# Patient Record
Sex: FEMALE | Race: WHITE | Employment: OTHER
[De-identification: names, ages, dates, MRNs, and addresses within clinical notes are randomized per-mention and may not be internally consistent; named-entity substitution may affect disease eponyms.]

---

## 2017-07-14 ENCOUNTER — HEALTH MAINTENANCE LETTER (OUTPATIENT)
Age: 42
End: 2017-07-14

## 2017-09-19 ENCOUNTER — OFFICE VISIT (OUTPATIENT)
Dept: OBGYN | Facility: CLINIC | Age: 42
End: 2017-09-19
Payer: COMMERCIAL

## 2017-09-19 VITALS
HEIGHT: 65 IN | BODY MASS INDEX: 46.52 KG/M2 | HEART RATE: 93 BPM | DIASTOLIC BLOOD PRESSURE: 89 MMHG | WEIGHT: 279.2 LBS | SYSTOLIC BLOOD PRESSURE: 139 MMHG

## 2017-09-19 DIAGNOSIS — R87.810 CERVICAL HIGH RISK HPV (HUMAN PAPILLOMAVIRUS) TEST POSITIVE: ICD-10-CM

## 2017-09-19 DIAGNOSIS — Z01.419 ENCOUNTER FOR GYNECOLOGICAL EXAMINATION WITHOUT ABNORMAL FINDING: Primary | ICD-10-CM

## 2017-09-19 PROCEDURE — 87624 HPV HI-RISK TYP POOLED RSLT: CPT | Performed by: OBSTETRICS & GYNECOLOGY

## 2017-09-19 PROCEDURE — 99386 PREV VISIT NEW AGE 40-64: CPT | Performed by: OBSTETRICS & GYNECOLOGY

## 2017-09-19 PROCEDURE — 88175 CYTOPATH C/V AUTO FLUID REDO: CPT | Performed by: OBSTETRICS & GYNECOLOGY

## 2017-09-19 NOTE — NURSING NOTE
"Chief Complaint   Patient presents with     Physical       Initial /89 (BP Location: Right arm, Patient Position: Chair, Cuff Size: Adult Large)  Pulse 93  Ht 5' 4.75\" (1.645 m)  Wt 279 lb 3.2 oz (126.6 kg)  BMI 46.82 kg/m2 Estimated body mass index is 46.82 kg/(m^2) as calculated from the following:    Height as of this encounter: 5' 4.75\" (1.645 m).    Weight as of this encounter: 279 lb 3.2 oz (126.6 kg).  Medication Reconciliation: complete     Amy Vazquez LPN      "
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
## 2017-09-19 NOTE — PATIENT INSTRUCTIONS
Preventive Health Recommendations  Female Ages 40 to 49    Yearly exam:     See your health care provider every year in order to  1. Review health changes.   2. Discuss preventive care.    3. Review your medicines if your doctor prescribed any.      Get a Pap test every three years (unless you have an abnormal result and your provider advises testing more often).      If you get Pap tests with HPV test, you only need to test every 5 years, unless you have an abnormal result. You do not need a Pap test if your uterus was removed (hysterectomy) and you have not had cancer.      You should be tested each year for STDs (sexually transmitted diseases), if you're at risk.       Ask your doctor if you should have a mammogram.      Have a colonoscopy (test for colon cancer) if someone in your family has had colon cancer or polyps before age 50.       Have a cholesterol test every 5 years.       Have a diabetes test (fasting glucose) after age 45. If you are at risk for diabetes, you should have this test every 3 years.    Shots: Get a flu shot each year. Get a tetanus shot every 10 years.     Nutrition:     Eat at least 5 servings of fruits and vegetables each day.    Eat whole-grain bread, whole-wheat pasta and brown rice instead of white grains and rice.    Talk to your provider about Calcium and Vitamin D.     Lifestyle    Exercise at least 150 minutes a week (an average of 30 minutes a day, 5 days a week). This will help you control your weight and prevent disease.    Limit alcohol to one drink per day.    No smoking.     Wear sunscreen to prevent skin cancer.    See your dentist every six months for an exam and cleaning.  Given info and referrals

## 2017-09-19 NOTE — MR AVS SNAPSHOT
After Visit Summary   9/19/2017    Sienna Salinas    MRN: [id number]           Patient Information     Date Of Birth          1975        Visit Information        Provider Department      9/19/2017 1:00 PM Brit Moseley MD [facility]        Today's Diagnoses     Encounter for gynecological examination without abnormal finding    -  1    Cervical high risk HPV (human papillomavirus) test positive        BMI 45.0-49.9, adult (H)          Care Instructions      Preventive Health Recommendations  Female Ages 40 to 49    Yearly exam:     See your health care provider every year in order to  1. Review health changes.   2. Discuss preventive care.    3. Review your medicines if your doctor prescribed any.      Get a Pap test every three years (unless you have an abnormal result and your provider advises testing more often).      If you get Pap tests with HPV test, you only need to test every 5 years, unless you have an abnormal result. You do not need a Pap test if your uterus was removed (hysterectomy) and you have not had cancer.      You should be tested each year for STDs (sexually transmitted diseases), if you're at risk.       Ask your doctor if you should have a mammogram.      Have a colonoscopy (test for colon cancer) if someone in your family has had colon cancer or polyps before age 50.       Have a cholesterol test every 5 years.       Have a diabetes test (fasting glucose) after age 45. If you are at risk for diabetes, you should have this test every 3 years.    Shots: Get a flu shot each year. Get a tetanus shot every 10 years.     Nutrition:     Eat at least 5 servings of fruits and vegetables each day.    Eat whole-grain bread, whole-wheat pasta and brown rice instead of white grains and rice.    Talk to your provider about Calcium and Vitamin D.     Lifestyle    Exercise at least 150 minutes a week (an average of 30 minutes a day, 5 days a week).  This will help you control your weight and prevent disease.    Limit alcohol to one drink per day.    No smoking.     Wear sunscreen to prevent skin cancer.    See your dentist every six months for an exam and cleaning.  Given info and referrals          Follow-ups after your visit        Additional Services     NUTRITION REFERRAL       Your provider has referred you to: [facility]: [facility] [phone]   [email or web address]    Please be aware that coverage of these services is subject to the terms and limitations of your health insurance plan.  Call member services at your health plan with any benefit or coverage questions.      Please bring the following with you to your appointment:    (1) This referral request  (2) Any documents given to you regarding this referral  (3) Any specific questions you have about diet and/or food choices            WEIGHT MANAGEMENT/ [facility] LIFESTYLE PROGRAM REFERRAL       To schedule an appointment, please call the [facility] at  [phone] or [facility] at [phone].                  Who to contact     If you have questions or need follow up information about today's clinic visit or your schedule please contact [facility] directly at [phone].  Normal or non-critical lab and imaging results will be communicated to you by MyChart, letter or phone within 4 business days after the clinic has received the results. If you do not hear from us within 7 days, please contact the clinic through [employer] or phone. If you have a critical or abnormal lab result, we will notify you by phone as soon as possible.  Submit refill requests through [employer] or call your pharmacy and they will forward the refill request to us. Please allow 3 business days for your refill to be completed.          Additional Information About Your Visit        [employer] Information     [employer] gives you secure access to your electronic  "health record. If you see a primary care provider, you can also send messages to your care team and make appointments. If you have questions, please call your primary care clinic.  If you do not have a primary care provider, please call [phone] and they will assist you.        Care EveryWhere ID     This is your Care EveryWhere ID. This could be used by other organizations to access your [place] medical records  [id number]        Your Vitals Were     Pulse Height BMI (Body Mass Index)             93 5' 4.75\" (1.645 m) 46.82 kg/m2          Blood Pressure from Last 3 Encounters:   09/19/17 139/89   12/07/16 133/76   09/21/16 127/82    Weight from Last 3 Encounters:   09/19/17 279 lb 3.2 oz (126.6 kg)   12/07/16 293 lb (132.9 kg)   12/05/16 293 lb (132.9 kg)              We Performed the Following     HPV High Risk Types DNA Cervical     NUTRITION REFERRAL     Pap imaged thin layer diagnostic with HPV (select HPV order below)     WEIGHT MANAGEMENT/ [facility] LIFESTYLE PROGRAM REFERRAL        Primary Care Provider Office Phone # Fax #    Ary Josefina Francois [phone] [id number] [facility] [id number]        Equal Access to Services     SADE GIBBS AH: Hadii aad ku hadasho Soomaali, waaxda luqadaha, qaybta kaalmada adeegyada, waxay kareem perezn burke prajapati. So [facility] [phone].    ATENCIÓN: Si habla español, tiene a zabala disposición servicios gratuitos de asistencia lingüística. Llame al [phone].    We comply with applicable federal civil rights laws and [place] laws. We do not discriminate on the basis of race, color, national origin, age, disability sex, sexual orientation or gender identity.            Thank you!     Thank you for choosing [facility]  for your care. Our goal is always to provide you with excellent care. Hearing back from our patients is one way we can continue to improve our services. Please take a few minutes to complete the written " survey that you may receive in the mail after your visit with us. Thank you!             Your Updated Medication List - Protect others around you: Learn how to safely use, store and throw away your medicines at www.disposemymeds.org.          This list is accurate as of: 9/19/17  1:34 PM.  Always use your most recent med list.                   Brand Name Dispense Instructions for use Diagnosis    amitriptyline 25 MG tablet    ELAVIL    90 tablet    Take 1 tablet (25 mg) by mouth At Bedtime    Insomnia due to medical condition, Fibromyalgia       calcium-vitamin D 500-125 MG-UNIT Tabs           cyclobenzaprine 10 MG tablet    FLEXERIL    180 tablet    Take 1 tablet (10 mg) by mouth 2 times daily as needed for muscle spasms    Fibromyalgia       gabapentin 300 MG capsule    NEURONTIN    270 capsule    Take 1 capsule (300 mg) by mouth 3 times daily    Fibromyalgia       levonorgestrel 20 MCG/24HR IUD    MIRENA     1 each by Intrauterine route once        Magnesium 400 MG Tabs      Take 400 mg by mouth daily        Multi-vitamin Tabs tablet      Take 1 tablet by mouth daily        order for DME     1 Device    Equipment being ordered: wrist brace    Right arm pain       traMADol 50 MG tablet    ULTRAM    270 tablet    Take 1 tablet (50 mg) by mouth every 6 hours as needed for moderate pain ; may take with 325 mg of acetaminophen    Fibromyalgia

## 2017-09-21 LAB
COPATH REPORT: NORMAL
PAP: NORMAL

## 2017-09-22 LAB
FINAL DIAGNOSIS: NORMAL
HPV HR 12 DNA CVX QL NAA+PROBE: NEGATIVE
HPV16 DNA SPEC QL NAA+PROBE: NEGATIVE
HPV18 DNA SPEC QL NAA+PROBE: NEGATIVE
SPECIMEN DESCRIPTION: NORMAL

## 2017-10-09 ENCOUNTER — HOSPITAL ENCOUNTER (EMERGENCY)
Facility: CLINIC | Age: 42
Discharge: HOME | End: 2017-10-09
Attending: STUDENT IN AN ORGANIZED HEALTH CARE EDUCATION/TRAINING PROGRAM | Admitting: STUDENT IN AN ORGANIZED HEALTH CARE EDUCATION/TRAINING PROGRAM
Payer: COMMERCIAL

## 2017-10-09 VITALS
WEIGHT: 271 LBS | OXYGEN SATURATION: 96 % | SYSTOLIC BLOOD PRESSURE: 146 MMHG | DIASTOLIC BLOOD PRESSURE: 88 MMHG | HEIGHT: 65 IN | HEART RATE: 85 BPM | RESPIRATION RATE: 18 BRPM | TEMPERATURE: 98.2 F | BODY MASS INDEX: 45.15 KG/M2

## 2017-10-09 DIAGNOSIS — H04.301 DACROCYSTITIS, RIGHT: ICD-10-CM

## 2017-10-09 PROCEDURE — 99282 EMERGENCY DEPT VISIT SF MDM: CPT | Performed by: STUDENT IN AN ORGANIZED HEALTH CARE EDUCATION/TRAINING PROGRAM

## 2017-10-09 PROCEDURE — 99284 EMERGENCY DEPT VISIT MOD MDM: CPT | Mod: Z6 | Performed by: STUDENT IN AN ORGANIZED HEALTH CARE EDUCATION/TRAINING PROGRAM

## 2017-10-09 NOTE — ED AVS SNAPSHOT
[facility] Emergency Department    [address] [facility] [address]    Phone:  [phone]    Fax:  [phone]                                       Sienna Salinas   MRN: [id number]    Department:  [facility] Emergency Department   Date of Visit:  10/9/2017           Patient Information     Date Of Birth          1975        Your diagnoses for this visit were:     Dacrocystitis, right        You were seen by Zev Guajardo DO.      Follow-up Information     Follow up with [facility]. Schedule an appointment as soon as possible for a visit in 3 days.    Why:  Followup for reevaluation if symptoms persist.    Contact information:    [address] [facility] [address] [phone]        Discharge Instructions         Infected Tear Sac (Dacryocystitis)    Dacryocystitis is an infection of the tear sac. The tear sac is the narrow pouch where tears from the eye drain before they flow into the nose. You have 1 tear sac for each eye.  Tears moisten and clean the eyes. They are made in a gland under the upper eyelids. Tiny tubes called tear ducts drain excess tears away from the eyes. The tears flow into the tear sacs, and then into the nose.  In some cases, a tear sac can get infected. This can happen if the tear duct is blocked. A blocked duct can happen for many reasons. It may be caused by an injury to the nose or eye. Or the duct may have narrowed on its own. A blocked tear duct can t drain tears. Bacteria may then become trapped in the duct and in the tear sac.  Dacryocystitis can cause redness, swelling, and pain just below the lower lid, near the nose. A fluid (pus) may drain from the eye. Antibiotics are used to treat the infection and stop it from spreading. If the infection doesn t go away, or comes back often, minor surgery may be needed to open the duct.  Home care  Follow these guidelines when caring for yourself at home:    You will be given antibiotic  medicine to treat the infection. Follow all instructions for taking this medication. It may be taken by mouth. Or it may be eye drops or eye ointment. If you have pain, you may use acetaminophen or ibuprofen to reduce pain and fever. (Note: If you have chronic liver or kidney disease, or you have ever had a stomach ulcer or gastrointestinal bleeding, talk with your health care provider before using these medicines.)    Wash your hands before caring for your eye.    Several times a day, apply a warm compress for 1 to 2 minutes. A warm compress is a clean towel damp with warm water.    After the warm compress, massage the tear sac with clean hands. Place your index finger between the corner of the eye and the nose. Your finger should be pointing toward the top of the nose. Gently massage from the nose toward the eye. A small amount of tear fluid or pus may appear in the corner of the eye.  Using eye drops  Apply drops in the corner of the eye, where the eyelid meets the nose. The drops will pool in this area. When you blink or open your eyelid, the drops will flow into the eye. Use the exact number of drops prescribed. Be careful not to touch your eye or eyelashes with the dropper.  Using ointment  If both drops and ointment are prescribed, use the drops first. Wait 3 minutes, and then apply the ointment. Doing this will give each medicine time to work. To apply the ointment, start by gently pulling down your lower lid. Place a thin line of ointment along the inside of the lid. Begin at the nose and move outward. Close the lid. Wipe away excess medicine from the nose area outward. This is to keep the eyes as clean as possible. Keep your eye closed for 1 or 2 minutes so the medicine has time to coat the eye. Eye ointment may cause blurry vision. This is normal. It may help to apply ointment at bedtime instead of during the day.  Follow-up care  Follow up with your health care provider, or as advised.  When to seek  medical advice  Call your health care provider right away if any of these occur.    Fever of 100.4 F (38 C) or higher after 2 days of antibiotic treatment    You are pregnant    You just had surgery or another medical procedure, or were just discharged from the hospital    Symptoms don t get better, or get worse  Date Last Reviewed: 6/24/2015 2000-2017 [employer]. [address] [place] [address], [place] [address]. All rights reserved. This information is not intended as a substitute for professional medical care. Always follow your healthcare professional's instructions.          Future Appointments        Provider Department Dept Phone Center    10/18/2017 11:30 AM [facility] MAMMO ROOM 2 [employer] [phone] [facility]      24 Hour Appointment Hotline       To make an appointment at any [facility] clinic, call [phone] ([phone]). If you don't have a family doctor or clinic, we will help you find one. [facility] clinics are conveniently located to serve the needs of you and your family.             Review of your medicines      START taking        Dose / Directions Last dose taken    amoxicillin-clavulanate 875-125 MG per tablet   Commonly known as:  AUGMENTIN   Dose:  1 tablet   Quantity:  20 tablet        Take 1 tablet by mouth 2 times daily for 10 days   Refills:  0          Our records show that you are taking the medicines listed below. If these are incorrect, please call your family doctor or clinic.        Dose / Directions Last dose taken    amitriptyline 25 MG tablet   Commonly known as:  ELAVIL   Dose:  25 mg   Quantity:  90 tablet        Take 1 tablet (25 mg) by mouth At Bedtime   Refills:  3        calcium-vitamin D 500-125 MG-UNIT Tabs        Refills:  0        cyclobenzaprine 10 MG tablet   Commonly known as:  FLEXERIL   Dose:  10 mg   Quantity:  180 tablet        Take 1 tablet (10 mg) by mouth 2 times daily as needed for muscle spasms   Refills:  1         gabapentin 300 MG capsule   Commonly known as:  NEURONTIN   Dose:  300 mg   Quantity:  270 capsule        Take 1 capsule (300 mg) by mouth 3 times daily   Refills:  3        levonorgestrel 20 MCG/24HR IUD   Commonly known as:  MIRENA   Dose:  1 each        1 each by Intrauterine route once   Refills:  0        Magnesium 400 MG Tabs   Dose:  400 mg        Take 400 mg by mouth daily   Refills:  0        Multi-vitamin Tabs tablet   Dose:  1 tablet        Take 1 tablet by mouth daily   Refills:  0        order for DME   Quantity:  1 Device        Equipment being ordered: wrist brace   Refills:  0        traMADol 50 MG tablet   Commonly known as:  ULTRAM   Dose:  50 mg   Quantity:  270 tablet        Take 1 tablet (50 mg) by mouth every 6 hours as needed for moderate pain ; may take with 325 mg of acetaminophen   Refills:  1                Prescriptions were sent or printed at these locations (1 Prescription)                   Other Prescriptions                Printed at Department/Unit printer (1 of 1)         amoxicillin-clavulanate (AUGMENTIN) 875-125 MG per tablet                Orders Needing Specimen Collection     None      Pending Results     No orders found from 10/7/2017 to 10/10/2017.            Pending Culture Results     No orders found from 10/7/2017 to 10/10/2017.            Pending Results Instructions     If you had any lab results that were not finalized at the time of your Discharge, you can call the ED Lab Result RN at [phone]. You will be contacted by this team for any positive Lab results or changes in treatment. The nurses are available 7 days a week from 10A to 6:30P.  You can leave a message 24 hours per day and they will return your call.        Test Results From Your Hospital Stay               Thank you for choosing [facility]       Thank you for choosing [facility] for your care. Our goal is always to provide you with excellent care. Hearing back from our patients is one way we can continue  to improve our services. Please take a few minutes to complete the written survey that you may receive in the mail after you visit with us. Thank you!        [facility] Information     [facility] gives you secure access to your electronic health record. If you see a primary care provider, you can also send messages to your care team and make appointments. If you have questions, please call your primary care clinic.  If you do not have a primary care provider, please call [phone] and they will assist you.        Care EveryWhere ID     This is your Care EveryWhere ID. This could be used by other organizations to access your [facility] medical records  [id number]        Equal Access to Services     SADE GIBBS : Imelda Nash, carolyn eller, taco bergeron. So [facility] [phone].    ATENCIÓN: Si habla español, tiene a zabala disposición servicios gratuitos de asistencia lingüística. Llame al [phone].    We comply with applicable federal civil rights laws and [place] laws. We do not discriminate on the basis of race, color, national origin, age, disability, sex, sexual orientation, or gender identity.            After Visit Summary       This is your record. Keep this with you and show to your community pharmacist(s) and doctor(s) at your next visit.

## 2017-10-09 NOTE — ED AVS SNAPSHOT
[facility] Emergency Department    [address] [facility] [address]    Phone:  [phone]    Fax:  [phone]                                       Sienna Salinas   MRN: [id number]    Department:  [facility] Emergency Department   Date of Visit:  10/9/2017           After Visit Summary Signature Page     I have received my discharge instructions, and my questions have been answered. I have discussed any challenges I see with this plan with the nurse or doctor.    ..........................................................................................................................................  Patient/Patient Representative Signature      ..........................................................................................................................................  Patient Representative Print Name and Relationship to Patient    ..................................................               ................................................  Date                                            Time    ..........................................................................................................................................  Reviewed by Signature/Title    ...................................................              ..............................................  Date                                                            Time

## 2017-10-10 NOTE — DISCHARGE INSTRUCTIONS
Infected Tear Sac (Dacryocystitis)    Dacryocystitis is an infection of the tear sac. The tear sac is the narrow pouch where tears from the eye drain before they flow into the nose. You have 1 tear sac for each eye.  Tears moisten and clean the eyes. They are made in a gland under the upper eyelids. Tiny tubes called tear ducts drain excess tears away from the eyes. The tears flow into the tear sacs, and then into the nose.  In some cases, a tear sac can get infected. This can happen if the tear duct is blocked. A blocked duct can happen for many reasons. It may be caused by an injury to the nose or eye. Or the duct may have narrowed on its own. A blocked tear duct can t drain tears. Bacteria may then become trapped in the duct and in the tear sac.  Dacryocystitis can cause redness, swelling, and pain just below the lower lid, near the nose. A fluid (pus) may drain from the eye. Antibiotics are used to treat the infection and stop it from spreading. If the infection doesn t go away, or comes back often, minor surgery may be needed to open the duct.  Home care  Follow these guidelines when caring for yourself at home:    You will be given antibiotic medicine to treat the infection. Follow all instructions for taking this medication. It may be taken by mouth. Or it may be eye drops or eye ointment. If you have pain, you may use acetaminophen or ibuprofen to reduce pain and fever. (Note: If you have chronic liver or kidney disease, or you have ever had a stomach ulcer or gastrointestinal bleeding, talk with your health care provider before using these medicines.)    Wash your hands before caring for your eye.    Several times a day, apply a warm compress for 1 to 2 minutes. A warm compress is a clean towel damp with warm water.    After the warm compress, massage the tear sac with clean hands. Place your index finger between the corner of the eye and the nose. Your finger should be pointing toward the top of the  nose. Gently massage from the nose toward the eye. A small amount of tear fluid or pus may appear in the corner of the eye.  Using eye drops  Apply drops in the corner of the eye, where the eyelid meets the nose. The drops will pool in this area. When you blink or open your eyelid, the drops will flow into the eye. Use the exact number of drops prescribed. Be careful not to touch your eye or eyelashes with the dropper.  Using ointment  If both drops and ointment are prescribed, use the drops first. Wait 3 minutes, and then apply the ointment. Doing this will give each medicine time to work. To apply the ointment, start by gently pulling down your lower lid. Place a thin line of ointment along the inside of the lid. Begin at the nose and move outward. Close the lid. Wipe away excess medicine from the nose area outward. This is to keep the eyes as clean as possible. Keep your eye closed for 1 or 2 minutes so the medicine has time to coat the eye. Eye ointment may cause blurry vision. This is normal. It may help to apply ointment at bedtime instead of during the day.  Follow-up care  Follow up with your health care provider, or as advised.  When to seek medical advice  Call your health care provider right away if any of these occur.    Fever of 100.4 F (38 C) or higher after 2 days of antibiotic treatment    You are pregnant    You just had surgery or another medical procedure, or were just discharged from the hospital    Symptoms don t get better, or get worse  Date Last Reviewed: 6/24/2015 2000-2017 [employer]. [address] [place] [address], [place] [address]. All rights reserved. This information is not intended as a substitute for professional medical care. Always follow your healthcare professional's instructions.

## 2017-10-10 NOTE — ED PROVIDER NOTES
History     Chief Complaint   Patient presents with     Stye / Hordeolum Evaluation     stye R eye     HPI  Sienna Salinas is a 41 year old female who presents for evaluation of right lower eyelid swelling over the past 24 hours. Patient states that she has had a stye in the past symptoms feel somewhat similar. The swelling began along the medial aspect of her lower eyelid but she also notes tenderness along the right side of her nose. She is without headache, double vision, ocular pain, or other concerning features.    I have reviewed the Medications, Allergies, Past Medical and Surgical History, and Social History in the Epic system.    Patient Active Problem List   Diagnosis     Cervical high risk HPV (human papillomavirus) test positive     IUD (intrauterine device) in place     Fibromyalgia     Morbid obesity (H)     Positive JONATHAN (antinuclear antibody)     Other atopic dermatitis and related conditions     History of vitamin D deficiency     Insomnia       No past surgical history on file.    Social History     Social History     Marital status:      Spouse name: Selvin     Number of children: 2     Years of education: N/A     Occupational History     Office      fulltime     Social History Main Topics     Smoking status: Former Smoker     Smokeless tobacco: Former User      Comment: quit as a teen     Alcohol use Yes      Comment: social     Drug use: No     Sexual activity: Yes     Partners: Male     Birth control/ protection: IUD      Comment: Mirena     Other Topics Concern     Not on file     Social History Narrative    How much exercise per week? 4x wk, walking and yoga    How much calcium per day? Not sure       How much caffeine per day? 1 cup coffee    How much vitamin D per day? Not sure    Do you/your family wear seatbelts?  Yes    Do you/your family use safety helmets? Yes    Do you/your family use sunscreen? Yes    Do you/your family keep firearms in the home? No    Do you/your family  "have a smoke detector(s)? Yes    Do you feel safe in your home? Yes    Has anyone ever touched you in an unwanted manner? No     Explain     See PittsJED gonzalez 02/26/2015       Family History   Problem Relation Age of Onset     DIABETES Father      Alcohol/Drug Mother      Other - See Comments Mother      embolism lung     DIABETES Paternal Grandmother      Allergies Brother      Seasonal     Allergies Sister      Seasonal       Most Recent Immunizations   Administered Date(s) Administered     Influenza (IIV3) 10/01/2014     Influenza Vaccine IM 3yrs+ 4 Valent IIV4 09/21/2016     TDAP Vaccine (Adacel) 07/14/2015         Review of Systems  Constitutional: Negative for fever or chills.  HENT: Negative oral or throat pain.  Eye: Positive right lower eyelid swelling and tenderness. Negative for visual changes from baseline.  Neurological: Negative for headache or dizziness.    All others reviewed and are negative.      Physical Exam   BP: 146/88  Pulse: 85  Temp: 98.2  F (36.8  C)  Resp: 18  Height: 165.1 cm (5' 5\")  Weight: 122.9 kg (271 lb)  SpO2: 96 %       Physical Exam  Constitutional: Well developed, well nourished. Appears nontoxic and in no acute distress.   Head: Normocephalic and atraumatic.  Eye: Right lower eyelid tenderness and inflammation along the medial aspect near tear duct, no expressible purulent discharge. No proptosis or subconjunctival hemorrhage. Negative for conjunctivitis, ciliary flushing or ocular discharge. EOMI and patient denies diplopia. PERRLA without pain. Anterior chamber depth equal bilaterally. Cornea appears clear without hyphema or hypopyon.  Neck: Neck supple.  Cardiovascular: No cyanosis.   Respiratory/Chest: Effort normal, no respiratory distress.   Musculoskeletal: Moves all extremities spontaneously and without complaint.  Neuro: Patient is alert.  Skin: Skin is warm and dry, not diaphoretic.  Psych: Appears to have a normal mood and affect.      ED Course     ED Course " "    Procedures               Critical Care time:  none               Labs Ordered and Resulted from Time of ED Arrival Up to the Time of Departure from the ED - No data to display    Assessments & Plan (with Medical Decision Making)   Sienna Salinas is a 41 year old female who presents to the department for evaluation of \"stye\" of right lower eyelid. The eye itself does not appear to be infected but difficult to discern whether she is suffering from a hordeolum or more likely dacryocystitis of the right lower eyelid. Does not appear consistent with chalazion. She will be started on oral antibiotic Augmentin and encouraged to use warm compresses 3 times a day while monitoring for improvement. If symptoms do not dramatically improve over the next 48-72 hours, she should schedule a follow-up with ophthalmology clinic.    Prior to discharge, I made it clear that illness can unexpectedly develop/progress so she has been instructed to return to the emergency department for reevaluation of evolving symptoms, change in severity, or other concerns. She seems comfortable with the discharge plan we discussed including follow up.    Disclaimer: This note consists of symbols derived from keyboarding, dictation, and/or voice recognition software. As a result, there may be errors in the script that have gone undetected.  Please consider this when interpreting information found in the chart.        I have reviewed the nursing notes.    I have reviewed the findings, diagnosis, plan and need for follow up with the patient.       Discharge Medication List as of 10/9/2017  8:40 PM      START taking these medications    Details   amoxicillin-clavulanate (AUGMENTIN) 875-125 MG per tablet Take 1 tablet by mouth 2 times daily for 10 days, Disp-20 tablet, R-0, Local Print             Final diagnoses:   Dacrocystitis, right       10/9/2017   [facility] EMERGENCY DEPARTMENT     Zev Guajardo,   10/09/17 2332    "

## 2017-10-18 ENCOUNTER — HOSPITAL ENCOUNTER (OUTPATIENT)
Dept: MAMMOGRAPHY | Facility: CLINIC | Age: 42
Discharge: HOME | End: 2017-10-18
Attending: OBSTETRICS & GYNECOLOGY | Admitting: OBSTETRICS & GYNECOLOGY
Payer: COMMERCIAL

## 2017-10-18 DIAGNOSIS — Z12.31 VISIT FOR SCREENING MAMMOGRAM: ICD-10-CM

## 2017-10-18 PROCEDURE — G0202 SCR MAMMO BI INCL CAD: HCPCS

## 2017-10-24 NOTE — PROGRESS NOTES
Your results are normal.  Please followup as was discussed in the office or call with questions.  Brit Moseley MD

## 2017-10-26 ENCOUNTER — ALLIED HEALTH/NURSE VISIT (OUTPATIENT)
Dept: FAMILY MEDICINE | Facility: CLINIC | Age: 42
End: 2017-10-26
Payer: COMMERCIAL

## 2017-10-26 DIAGNOSIS — Z23 NEED FOR PROPHYLACTIC VACCINATION AND INOCULATION AGAINST INFLUENZA: Primary | ICD-10-CM

## 2017-10-26 PROCEDURE — 90471 IMMUNIZATION ADMIN: CPT

## 2017-10-26 PROCEDURE — 99207 ZZC NO CHARGE NURSE ONLY: CPT

## 2017-10-26 PROCEDURE — 90686 IIV4 VACC NO PRSV 0.5 ML IM: CPT

## 2017-10-26 NOTE — MR AVS SNAPSHOT
After Visit Summary   10/26/2017    Sienna Salinas    MRN: [id number]           Patient Information     Date Of Birth          1975        Visit Information        Provider Department      10/26/2017 3:15 PM Juan M/Gavino Ni [facility]        Today's Diagnoses     Need for prophylactic vaccination and inoculation against influenza    -  1       Follow-ups after your visit        Who to contact     If you have questions or need follow up information about today's clinic visit or your schedule please contact [facility] directly at [phone].  Normal or non-critical lab and imaging results will be communicated to you by [facility], letter or phone within 4 business days after the clinic has received the results. If you do not hear from us within 7 days, please contact the clinic through [employer] or phone. If you have a critical or abnormal lab result, we will notify you by phone as soon as possible.  Submit refill requests through [employer] or call your pharmacy and they will forward the refill request to us. Please allow 3 business days for your refill to be completed.          Additional Information About Your Visit        MyChart Information     [employer] gives you secure access to your electronic health record. If you see a primary care provider, you can also send messages to your care team and make appointments. If you have questions, please call your primary care clinic.  If you do not have a primary care provider, please call [phone] and they will assist you.        Care EveryWhere ID     This is your Care EveryWhere ID. This could be used by other organizations to access your [facility] medical records  [id number]         Blood Pressure from Last 3 Encounters:   10/09/17 146/88   09/19/17 139/89   12/07/16 133/76    Weight from Last 3 Encounters:   10/09/17 271 lb (122.9 kg)   09/19/17 279 lb 3.2 oz (126.6 kg)   12/07/16 293 lb (132.9 kg)               We Performed the Following     FLU VAC, SPLIT VIRUS IM > 3 YO (QUADRIVALENT) [66206]     Vaccine Administration, Initial [25328]        Primary Care Provider Office Phone # Fax #    [facility] [phone] [address]  [facility] [address]        Equal Access to Services     SADE GIBBS : Hadii aad ku hadasho Soomaali, waaxda luqadaha, qaybta kaalmada adeegyada, waxay adelitain hayaan adeeg khcallumsh laLoganaan . So [facility] [phone].    ATENCIÓN: Si habla español, tiene a zabala disposición servicios gratuitos de asistencia lingüística. Llame al [phone].    We comply with applicable federal civil rights laws and [place] laws. We do not discriminate on the basis of race, color, national origin, age, disability, sex, sexual orientation, or gender identity.            Thank you!     Thank you for choosing [facility]  for your care. Our goal is always to provide you with excellent care. Hearing back from our patients is one way we can continue to improve our services. Please take a few minutes to complete the written survey that you may receive in the mail after your visit with us. Thank you!             Your Updated Medication List - Protect others around you: Learn how to safely use, store and throw away your medicines at www.disposemymeds.org.          This list is accurate as of: 10/26/17  3:26 PM.  Always use your most recent med list.                   Brand Name Dispense Instructions for use Diagnosis    calcium-vitamin D 500-125 MG-UNIT Tabs      Take 1 tablet by mouth daily        cyclobenzaprine 10 MG tablet    FLEXERIL    180 tablet    Take 1 tablet (10 mg) by mouth 2 times daily as needed for muscle spasms    Fibromyalgia       levonorgestrel 20 MCG/24HR IUD    MIRENA     1 each by Intrauterine route once        Magnesium 400 MG Tabs      Take 400 mg by mouth daily        Multi-vitamin Tabs tablet      Take 1 tablet by mouth daily        order for  DME     1 Device    Equipment being ordered: wrist brace    Right arm pain       traMADol 50 MG tablet    ULTRAM    270 tablet    Take 1 tablet (50 mg) by mouth every 6 hours as needed for moderate pain ; may take with 325 mg of acetaminophen    Fibromyalgia

## 2017-10-26 NOTE — PROGRESS NOTES

## 2018-01-23 ENCOUNTER — HOSPITAL ENCOUNTER (EMERGENCY)
Facility: CLINIC | Age: 43
Discharge: HOME | End: 2018-01-23
Attending: NURSE PRACTITIONER | Admitting: NURSE PRACTITIONER
Payer: COMMERCIAL

## 2018-01-23 VITALS
BODY MASS INDEX: 45.93 KG/M2 | WEIGHT: 276 LBS | TEMPERATURE: 99.7 F | RESPIRATION RATE: 18 BRPM | OXYGEN SATURATION: 99 % | HEART RATE: 91 BPM | DIASTOLIC BLOOD PRESSURE: 101 MMHG | SYSTOLIC BLOOD PRESSURE: 161 MMHG
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
## 2018-01-23 DIAGNOSIS — M54.2 NECK PAIN: Primary | ICD-10-CM

## 2018-01-23 DIAGNOSIS — M79.10 MUSCLE ACHE: ICD-10-CM

## 2018-01-23 DIAGNOSIS — M79.7 FIBROMYALGIA: ICD-10-CM

## 2018-01-23 DIAGNOSIS — M54.50 ACUTE LEFT-SIDED LOW BACK PAIN WITHOUT SCIATICA: ICD-10-CM

## 2018-01-23 PROCEDURE — 99213 OFFICE O/P EST LOW 20 MIN: CPT | Performed by: NURSE PRACTITIONER

## 2018-01-23 PROCEDURE — G0463 HOSPITAL OUTPT CLINIC VISIT: HCPCS

## 2018-01-23 RX ORDER — TIZANIDINE 2 MG/1
2 TABLET ORAL 3 TIMES DAILY PRN
Qty: 30 TABLET | Refills: 0 | Status: SHIPPED | OUTPATIENT
Start: 2018-01-23 | End: 2018-01-25 | Stop reason: SINTOL

## 2018-01-23 RX ORDER — TRAMADOL HYDROCHLORIDE 50 MG/1
50 TABLET ORAL EVERY 6 HOURS PRN
Qty: 30 TABLET | Refills: 0 | Status: SHIPPED | OUTPATIENT
Start: 2018-01-23 | End: 2018-08-17

## 2018-01-23 ASSESSMENT — ENCOUNTER SYMPTOMS
VOMITING: 0
MYALGIAS: 1
CONSTIPATION: 0
NAUSEA: 0
EYE PAIN: 0
CHOKING: 0
SEIZURES: 0
SORE THROAT: 0
COUGH: 0
DIAPHORESIS: 0
CHILLS: 0
DIFFICULTY URINATING: 0
BLOOD IN STOOL: 0
SHORTNESS OF BREATH: 0
SINUS PAIN: 0
HEADACHES: 1
WHEEZING: 0
BRUISES/BLEEDS EASILY: 0
SINUS PRESSURE: 0
FEVER: 0
DIARRHEA: 0
SPEECH DIFFICULTY: 0
ABDOMINAL PAIN: 0
HEMATURIA: 0
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
## 2018-01-23 NOTE — ED AVS SNAPSHOT
[facility] Emergency Department    [address] [address]    Phone:  [phone]    Fax:  [phone]                                       Sienna Salinas   MRN: [id number]    Department:  [facility] Emergency Department   Date of Visit:  1/23/2018           After Visit Summary Signature Page     I have received my discharge instructions, and my questions have been answered. I have discussed any challenges I see with this plan with the nurse or doctor.    ..........................................................................................................................................  Patient/Patient Representative Signature      ..........................................................................................................................................  Patient Representative Print Name and Relationship to Patient    ..................................................               ................................................  Date                                            Time    ..........................................................................................................................................  Reviewed by Signature/Title    ...................................................              ..............................................  Date                                                            Time
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
## 2018-01-24 ENCOUNTER — TELEPHONE (OUTPATIENT)
Dept: FAMILY MEDICINE | Facility: CLINIC | Age: 43
End: 2018-01-24
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
## 2018-01-25 RX ORDER — CYCLOBENZAPRINE HCL 5 MG
5 TABLET ORAL 3 TIMES DAILY PRN
Qty: 30 TABLET | Refills: 0 | Status: SHIPPED | OUTPATIENT
Start: 2018-01-25 | End: 2018-08-17

## 2018-06-22 ENCOUNTER — OFFICE VISIT (OUTPATIENT)
Dept: URGENT CARE | Facility: URGENT CARE | Age: 43
End: 2018-06-22
Payer: COMMERCIAL

## 2018-06-22 VITALS
SYSTOLIC BLOOD PRESSURE: 122 MMHG | BODY MASS INDEX: 47.63 KG/M2 | WEIGHT: 286.2 LBS | RESPIRATION RATE: 18 BRPM | HEART RATE: 60 BPM | DIASTOLIC BLOOD PRESSURE: 66 MMHG | TEMPERATURE: 98.6 F

## 2018-06-22 DIAGNOSIS — B37.31 CANDIDIASIS OF VULVA AND VAGINA: ICD-10-CM

## 2018-06-22 DIAGNOSIS — J01.01 ACUTE RECURRENT MAXILLARY SINUSITIS: Primary | ICD-10-CM

## 2018-06-22 PROCEDURE — 99213 OFFICE O/P EST LOW 20 MIN: CPT | Performed by: NURSE PRACTITIONER

## 2018-06-22 RX ORDER — FLUCONAZOLE 150 MG/1
150 TABLET ORAL ONCE
Qty: 1 TABLET | Refills: 1 | Status: SHIPPED | OUTPATIENT
Start: 2018-06-22 | End: 2018-06-22

## 2018-06-22 RX ORDER — AZITHROMYCIN 250 MG/1
TABLET, FILM COATED ORAL
Qty: 6 TABLET | Refills: 0 | Status: SHIPPED | OUTPATIENT
Start: 2018-06-22 | End: 2018-08-17

## 2018-06-22 NOTE — NURSING NOTE
"Chief Complaint   Patient presents with     URI     Started over 1.5 weeks ago. Congestion, pressure, cough, fatigue. Fever earlier in the week.  Mucinex        Initial /66 (BP Location: Right arm, Cuff Size: Adult Large)  Pulse 60  Temp 98.6  F (37  C) (Tympanic)  Resp 18  Wt 286 lb 3.2 oz (129.8 kg)  BMI 47.63 kg/m2 Estimated body mass index is 47.63 kg/(m^2) as calculated from the following:    Height as of 10/9/17: 5' 5\" (1.651 m).    Weight as of this encounter: 286 lb 3.2 oz (129.8 kg).      Health Maintenance that is potentially due pending provider review:  NONE    n/a    Is there anyone who you would like to be able to receive your results? Not Applicable  If yes have patient fill out KAREN  James Barraza M.A.        "

## 2018-06-22 NOTE — PROGRESS NOTES
SUBJECTIVE:   Sienna Salinas is a 42 year old female who presents to clinic today for the following health issues:  Chief Complaint   Patient presents with     URI     Started over 1.5 weeks ago. Congestion, pressure, cough, fatigue. Fever earlier in the week.  Mucinex                  Problem list and histories reviewed & adjusted, as indicated.  Additional history: as documented    Patient Active Problem List   Diagnosis     Cervical high risk HPV (human papillomavirus) test positive     IUD (intrauterine device) in place     Fibromyalgia     Morbid obesity (H)     Positive JONATHAN (antinuclear antibody)     Other atopic dermatitis and related conditions     History of vitamin D deficiency     Insomnia     History reviewed. No pertinent surgical history.    Social History   Substance Use Topics     Smoking status: Former Smoker     Smokeless tobacco: Former User      Comment: quit as a teen     Alcohol use Yes      Comment: social     Family History   Problem Relation Age of Onset     Diabetes Father      Alcohol/Drug Mother      Other - See Comments Mother      embolism lung     Diabetes Paternal Grandmother      Allergies Brother      Seasonal     Allergies Sister      Seasonal         Current Outpatient Prescriptions   Medication Sig Dispense Refill     azithromycin (ZITHROMAX Z-SINDI) 250 MG tablet Take 2 tablets on day 1 and then take 1 tablet days 2-5 6 tablet 0     calcium-vitamin D 500-125 MG-UNIT TABS Take 1 tablet by mouth daily        cyclobenzaprine (FLEXERIL) 5 MG tablet Take 1 tablet (5 mg) by mouth 3 times daily as needed for muscle spasms 30 tablet 0     fluconazole (DIFLUCAN) 150 MG tablet Take 1 tablet (150 mg) by mouth once for 1 dose Repeat in 4-5 days if needed. 1 tablet 1     levonorgestrel (MIRENA) 20 MCG/24HR IUD 1 each by Intrauterine route once       Magnesium 400 MG TABS Take 400 mg by mouth daily       multivitamin, therapeutic with minerals (MULTI-VITAMIN) TABS Take 1 tablet by mouth  daily       order for DME Equipment being ordered: wrist brace 1 Device 0     traMADol (ULTRAM) 50 MG tablet Take 1 tablet (50 mg) by mouth every 6 hours as needed for moderate pain ; may take with 500 mg of acetaminophen 30 tablet 0     Allergies   Allergen Reactions     Shellfish Allergy Anaphylaxis     Iodine Hives     Sulfa Drugs Hives     And vomiting     Labs reviewed in EPIC    Reviewed and updated as needed this visit by clinical staff  Tobacco  Allergies  Meds  Problems  Med Hx  Surg Hx  Fam Hx  Soc Hx        Reviewed and updated as needed this visit by Provider  Allergies  Meds  Problems         ROS:  Constitutional, HEENT, cardiovascular, pulmonary, GI, , musculoskeletal, neuro, skin, endocrine and psych systems are negative, except as otherwise noted.    OBJECTIVE:     /66 (BP Location: Right arm, Cuff Size: Adult Large)  Pulse 60  Temp 98.6  F (37  C) (Tympanic)  Resp 18  Wt 286 lb 3.2 oz (129.8 kg)  BMI 47.63 kg/m2  Body mass index is 47.63 kg/(m^2).   GENERAL: healthy, alert and no distress, nontoxic in appearance  EYES: Eyes grossly normal to inspection, PERRL and conjunctivae and sclerae normal  HENT: ear canals and TM's normal, nose and mouth without ulcers or lesions, very congested with maxillary pain more on left  NECK: no adenopathy, supple with full ROM  RESP: lungs clear to auscultation - no rales, rhonchi or wheezes  CV: regular rate and rhythm, normal S1 S2, no S3 or S4, no murmur, click or rub, no peripheral edema   ABDOMEN: soft, nontender, no hepatosplenomegaly, no masses and bowel sounds normal  MS: no gross musculoskeletal defects noted, no edema  No rash    Diagnostic Test Results:  No results found for this or any previous visit (from the past 24 hour(s)).    ASSESSMENT/PLAN:   Will also give diflucan as she always gets yeast infection with antibiotic. She has recurrent sinus infections and states zithromax works best for her.  Problem List Items Addressed This  Visit     None      Visit Diagnoses     Acute recurrent maxillary sinusitis    -  Primary    Relevant Medications    azithromycin (ZITHROMAX Z-SINDI) 250 MG tablet    fluconazole (DIFLUCAN) 150 MG tablet    Candidiasis of vulva and vagina        Relevant Medications    fluconazole (DIFLUCAN) 150 MG tablet               Patient Instructions   Increase rest and fluids. Tylenol and/or Ibuprofen for comfort. Cool mist vaporizer. If your symptoms worsen or do not resolve follow up with your primary care provider in 2 weeks and sooner if needed.      Mucinex 600 mg 12 hour formula for ear, head and chest congestion.  It can also thin post nasal drip which can cause a cough and sore throat.    Indications for emergent return to emergency department discussed with patient, who verbalized good understanding and agreement.  Patient understands the limitations of today's evaluation.           Sinusitis (Antibiotic Treatment)    The sinuses are air-filled spaces within the bones of the face. They connect to the inside of the nose. Sinusitis is an inflammation of the tissue that lines the sinuses. Sinusitis can occur during a cold. It can also happen due to allergies to pollens and other particles in the air. Sinusitis can cause symptoms of sinus congestion and a feeling of fullness. A sinus infection causes fever, headache, and facial pain. There is often green or yellow fluid draining from the nose or into the back of the throat (post-nasal drip). You have been given antibiotics to treat this condition.  Home care    Take the full course of antibiotics as instructed. Do not stop taking them, even when you feel better.    Drink plenty of water, hot tea, and other liquids. This may help thin nasal mucus. It also may help your sinuses drain fluids.    Heat may help soothe painful areas of your face. Use a towel soaked in hot water. Or,  the shower and direct the warm spray onto your face. Using a vaporizer along with a  menthol rub at night may also help soothe symptoms.     An expectorant with guaifenesin may help thin nasal mucus and help your sinuses drain fluids.    You can use an over-the-counter decongestant, unless a similar medicine was prescribed to you. Nasal sprays work the fastest. Use one that contains phenylephrine or oxymetazoline. First blow your nose gently. Then use the spray. Do not use these medicines more often than directed on the label. If you do, your symptoms may get worse. You may also take pills that contain pseudoephedrine. Don t use products that combine multiple medicines. This is because side effects may be increased. Read labels. You can also ask the pharmacist for help. (People with high blood pressure should not use decongestants. They can raise blood pressure.)    Over-the-counter antihistamines may help if allergies contributed to your sinusitis.      Do not use nasal rinses or irrigation during an acute sinus infection, unless your healthcare provider tells you to. Rinsing may spread the infection to other areas in your sinuses.    Use acetaminophen or ibuprofen to control pain, unless another pain medicine was prescribed to you. If you have chronic liver or kidney disease or ever had a stomach ulcer, talk with your healthcare provider before using these medicines. (Aspirin should never be taken by anyone under age 18 who is ill with a fever. It may cause severe liver damage.)    Don't smoke. This can make symptoms worse.  Follow-up care  Follow up with your healthcare provider or our staff if you are better in 1 week.  When to seek medical advice  Call your healthcare provider if any of these occur:    Facial pain or headache that gets worse    Stiff neck    Unusual drowsiness or confusion    Swelling of your forehead or eyelids    Vision problems, such as blurred or double vision    Fever of 100.4 F (38 C) or higher, or as directed by your healthcare provider    Seizure    Breathing  problems    Symptoms don't go away in 10 days  Prevention  Here are steps you can take to help prevent an infection:    Keep good hand washing habits.    Don t have close contact with people who have sore throats, colds, or other upper respiratory infections.    Don t smoke, and stay away from secondhand smoke.    Stay up to date with of your vaccines.  Date Last Reviewed: 11/1/2017 2000-2017 [employer]. [address] [place] [address], [place] [address]. All rights reserved. This information is not intended as a substitute for professional medical care. Always follow your healthcare professional's instructions.            [facility]
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
## 2018-06-23 NOTE — PATIENT INSTRUCTIONS
Increase rest and fluids. Tylenol and/or Ibuprofen for comfort. Cool mist vaporizer. If your symptoms worsen or do not resolve follow up with your primary care provider in 2 weeks and sooner if needed.      Mucinex 600 mg 12 hour formula for ear, head and chest congestion.  It can also thin post nasal drip which can cause a cough and sore throat.    Indications for emergent return to emergency department discussed with patient, who verbalized good understanding and agreement.  Patient understands the limitations of today's evaluation.           Sinusitis (Antibiotic Treatment)    The sinuses are air-filled spaces within the bones of the face. They connect to the inside of the nose. Sinusitis is an inflammation of the tissue that lines the sinuses. Sinusitis can occur during a cold. It can also happen due to allergies to pollens and other particles in the air. Sinusitis can cause symptoms of sinus congestion and a feeling of fullness. A sinus infection causes fever, headache, and facial pain. There is often green or yellow fluid draining from the nose or into the back of the throat (post-nasal drip). You have been given antibiotics to treat this condition.  Home care    Take the full course of antibiotics as instructed. Do not stop taking them, even when you feel better.    Drink plenty of water, hot tea, and other liquids. This may help thin nasal mucus. It also may help your sinuses drain fluids.    Heat may help soothe painful areas of your face. Use a towel soaked in hot water. Or,  the shower and direct the warm spray onto your face. Using a vaporizer along with a menthol rub at night may also help soothe symptoms.     An expectorant with guaifenesin may help thin nasal mucus and help your sinuses drain fluids.    You can use an over-the-counter decongestant, unless a similar medicine was prescribed to you. Nasal sprays work the fastest. Use one that contains phenylephrine or oxymetazoline. First blow  your nose gently. Then use the spray. Do not use these medicines more often than directed on the label. If you do, your symptoms may get worse. You may also take pills that contain pseudoephedrine. Don t use products that combine multiple medicines. This is because side effects may be increased. Read labels. You can also ask the pharmacist for help. (People with high blood pressure should not use decongestants. They can raise blood pressure.)    Over-the-counter antihistamines may help if allergies contributed to your sinusitis.      Do not use nasal rinses or irrigation during an acute sinus infection, unless your healthcare provider tells you to. Rinsing may spread the infection to other areas in your sinuses.    Use acetaminophen or ibuprofen to control pain, unless another pain medicine was prescribed to you. If you have chronic liver or kidney disease or ever had a stomach ulcer, talk with your healthcare provider before using these medicines. (Aspirin should never be taken by anyone under age 18 who is ill with a fever. It may cause severe liver damage.)    Don't smoke. This can make symptoms worse.  Follow-up care  Follow up with your healthcare provider or our staff if you are better in 1 week.  When to seek medical advice  Call your healthcare provider if any of these occur:    Facial pain or headache that gets worse    Stiff neck    Unusual drowsiness or confusion    Swelling of your forehead or eyelids    Vision problems, such as blurred or double vision    Fever of 100.4 F (38 C) or higher, or as directed by your healthcare provider    Seizure    Breathing problems    Symptoms don't go away in 10 days  Prevention  Here are steps you can take to help prevent an infection:    Keep good hand washing habits.    Don t have close contact with people who have sore throats, colds, or other upper respiratory infections.    Don t smoke, and stay away from secondhand smoke.    Stay up to date with of your  vaccines.  Date Last Reviewed: 11/1/2017 2000-2017 [employer]. [address] [place] [address], [place] [address]. All rights reserved. This information is not intended as a substitute for professional medical care. Always follow your healthcare professional's instructions.

## 2018-08-17 ENCOUNTER — HOSPITAL ENCOUNTER (EMERGENCY)
Facility: CLINIC | Age: 43
Discharge: HOME | End: 2018-08-17
Attending: NURSE PRACTITIONER | Admitting: NURSE PRACTITIONER
Payer: COMMERCIAL

## 2018-08-17 VITALS
RESPIRATION RATE: 18 BRPM | DIASTOLIC BLOOD PRESSURE: 87 MMHG | SYSTOLIC BLOOD PRESSURE: 142 MMHG | OXYGEN SATURATION: 98 % | TEMPERATURE: 98.4 F

## 2018-08-17 DIAGNOSIS — S61.012A THUMB LACERATION, LEFT, INITIAL ENCOUNTER: ICD-10-CM

## 2018-08-17 PROCEDURE — 12001 RPR S/N/AX/GEN/TRNK 2.5CM/<: CPT | Mod: Z6 | Performed by: NURSE PRACTITIONER

## 2018-08-17 PROCEDURE — G0463 HOSPITAL OUTPT CLINIC VISIT: HCPCS | Performed by: NURSE PRACTITIONER

## 2018-08-17 PROCEDURE — 99213 OFFICE O/P EST LOW 20 MIN: CPT | Mod: 25 | Performed by: NURSE PRACTITIONER

## 2018-08-17 PROCEDURE — 12001 RPR S/N/AX/GEN/TRNK 2.5CM/<: CPT | Performed by: NURSE PRACTITIONER

## 2018-08-17 RX ORDER — BUPIVACAINE HYDROCHLORIDE 2.5 MG/ML
INJECTION, SOLUTION INFILTRATION; PERINEURAL
Status: DISCONTINUED
Start: 2018-08-17 | End: 2018-08-17 | Stop reason: HOSPADM

## 2018-08-17 ASSESSMENT — ENCOUNTER SYMPTOMS
ABDOMINAL PAIN: 0
SORE THROAT: 0
DIAPHORESIS: 0
WHEEZING: 0
SINUS PAIN: 0
EYE PAIN: 0
NAUSEA: 0
FEVER: 0
VOMITING: 0
WOUND: 1
SHORTNESS OF BREATH: 0
CHILLS: 0
CONSTIPATION: 0
DIARRHEA: 0
DYSURIA: 0
COUGH: 0
HEMATURIA: 0
DIFFICULTY URINATING: 0
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
## 2018-08-17 NOTE — DISCHARGE INSTRUCTIONS
Small or Superficial Laceration: Not Stitched  A laceration is a cut through the skin. A laceration requires stitches or staples if it is deep or spread open. A small laceration often doesn't require stitches.   You may need a tetanus shot. This may be given if you have no record of this vaccination and the object that caused the cut may lead to tetanus  Home care    Your healthcare provider may prescribe an antibiotic. This is to help prevent infection. Follow all instructions for taking this medicine. Take the medicine every day until it is gone or you are told to stop. You should not have any left over.    The healthcare provider may prescribe medicines for pain. Follow instructions for taking them.    Follow the healthcare provider s instructions on how to care for the cut.    Wash your hands with soap and warm water before and after caring for cut. This helps prevent infection.    Keep the wound clean and dry. If a bandage was applied and it becomes wet or dirty, replace it. Otherwise, leave it in place for the first 24 hours, then change it once a day or as directed.    Clean the wound daily:  ? After removing any bandage, wash the area with soap and water. Use a wet cotton swab to loosen and remove any blood or crust that forms.  ? After cleaning, keep the wound clean and dry. Talk with your healthcare provider before applying any antibiotic ointment to the wound. Reapply a fresh bandage.    You may remove the bandage to shower as usual after the first 24 hours, but don't soak the area in water (no tub baths or swimming) for the next 5 days.    If the area gets wet, gently pat it dry with a clean cloth. Replace the wet bandage with a dry one.    Don't do activities that may reinjure your wound.    Don't scratch, rub, or pick at the area.    Check the wound daily for signs of infection listed below.  Follow-up care  Follow up with your healthcare provider, or as advised.  When to seek medical advice  Call  your healthcare provider right away if any of these occur:    Wound bleeding not controlled by direct pressure    Signs of infection, including increasing pain in the wound, increasing wound redness or swelling, or pus or bad odor coming from the wound    Fever of 100.4 F (38 C) or higher, or as directed by your healthcare provider    Wound edges reopen    Wound changes colors    Numbness around the wound     Decreased movement around the injured area  Date Last Reviewed: 7/1/2017 2000-2017 [employer]. [address] [place] [address]. All rights reserved. This information is not intended as a substitute for professional medical care. Always follow your healthcare professional's instructions.

## 2018-08-17 NOTE — ED AVS SNAPSHOT
[facility] Emergency Department    [address] [facility] [address]    Phone:  [phone]    Fax:  [phone]                                       Sienna Salinas   MRN: [id number]    Department:  [facility] Emergency Department   Date of Visit:  8/17/2018           After Visit Summary Signature Page     I have received my discharge instructions, and my questions have been answered. I have discussed any challenges I see with this plan with the nurse or doctor.    ..........................................................................................................................................  Patient/Patient Representative Signature      ..........................................................................................................................................  Patient Representative Print Name and Relationship to Patient    ..................................................               ................................................  Date                                            Time    ..........................................................................................................................................  Reviewed by Signature/Title    ...................................................              ..............................................  Date                                                            Time

## 2018-08-17 NOTE — ED PROVIDER NOTES
History     Chief Complaint   Patient presents with     Laceration     left thumb- cutting bread     HPI  Sienna Salinas is a 42 year old female who presents with left thumb laceration.  Pt states was cutting bread with a serrated knife and cut her left thumb.  PT reports normal sensation and movement of left thumb.  Pt states pain level 2/10 and is described as tingling, achy.  Reports feeling well otherwise    Problem List:    Patient Active Problem List    Diagnosis Date Noted     Positive JONATHAN (antinuclear antibody) 04/02/2015     Priority: Medium     1:80 titre, did not fit criteria for lupus       Other atopic dermatitis and related conditions 04/02/2015     Priority: Medium     History of vitamin D deficiency 04/02/2015     Priority: Medium     Insomnia 04/02/2015     Priority: Medium     Fibromyalgia 03/18/2015     Priority: Medium     referrred to [facility] for myofascial pain relief       Morbid obesity (H) 03/18/2015     Priority: Medium     IUD (intrauterine device) in place 03/17/2015     Priority: Medium     Mirena Feb 2015       Cervical high risk HPV (human papillomavirus) test positive 03/11/2015     Priority: Medium     2/26/2015: NIL Pap, + HR HPV (Neg 16/18)  Needs repeat pap/HPV 2/2016. Pt aware.  6/14/16: ASCUS, Neg HPV. Plan colp  9/21/16: No colp done per GYN provider. Plan cotest in 1 year (6/14/17).   9/19/17 Pap: NIL/neg HPV. Plan to repeat Pap+HPV cotesting in 3 yrs (2020).          Past Medical History:    Past Medical History:   Diagnosis Date     ASCUS of cervix with negative high risk HPV 6/14/16     Chronic fatigue syndrome 2013     Dysmenorrhea      Encounter for insertion of mirena IUD 10/04/2010, Feb 2015     Fibromyalgia 2013     Pituitary adenoma (H)        Past Surgical History:    No past surgical history on file.    Family History:    Family History   Problem Relation Age of Onset     Diabetes Father      Alcohol/Drug Mother      Other - See Comments Mother       embolism lung     Diabetes Paternal Grandmother      Allergies Brother      Seasonal     Allergies Sister      Seasonal       Social History:  Marital Status:   [2]  Social History   Substance Use Topics     Smoking status: Former Smoker     Smokeless tobacco: Former User      Comment: quit as a teen     Alcohol use Yes      Comment: social        Medications:      calcium-vitamin D 500-125 MG-UNIT TABS   levonorgestrel (MIRENA) 20 MCG/24HR IUD   Magnesium 400 MG TABS   multivitamin, therapeutic with minerals (MULTI-VITAMIN) TABS   order for DME     Review of Systems   Constitutional: Negative for chills, diaphoresis and fever.   HENT: Negative for ear pain, sinus pain and sore throat.    Eyes: Negative for pain.   Respiratory: Negative for cough, shortness of breath and wheezing.    Cardiovascular: Negative for chest pain.   Gastrointestinal: Negative for abdominal pain, constipation, diarrhea, nausea and vomiting.   Genitourinary: Negative for difficulty urinating, dysuria, hematuria and urgency.   Skin: Positive for wound (left thumb).   All other systems reviewed and are negative.      Physical Exam   BP: 142/87  Heart Rate: 80  Temp: 98.4  F (36.9  C)  Resp: 18  SpO2: 98 %      Physical Exam   Constitutional: She appears well-developed and well-nourished.   HENT:   Head: Normocephalic and atraumatic.   Eyes: Conjunctivae are normal. Right eye exhibits no discharge. Left eye exhibits no discharge.   Cardiovascular: Normal rate, regular rhythm and normal heart sounds.  Exam reveals no gallop and no friction rub.    No murmur heard.  Pulmonary/Chest: Effort normal and breath sounds normal. No respiratory distress. She has no wheezes. She has no rales. She exhibits no tenderness.   Neurological: She is alert.   Skin: Skin is warm and dry. Laceration (2 mm superficial laceration noted on tip of left thumb without any bleeding) noted.   Psychiatric: She has a normal mood and affect.   Nursing note and vitals  reviewed.      ED Course     ED Course     Procedures  The laceration was cleansed with saline and Hibiclens and was dressed with bacitracin and a Band-Aid.  Patient was instructed on wound healing and monitoring for infection.    No results found for this or any previous visit (from the past 24 hour(s)).    Medications   bupivacaine (MARCAINE) 0.25 % injection (not administered)       Assessments & Plan (with Medical Decision Making)     I have reviewed the nursing notes.    I have reviewed the findings, diagnosis, plan and need for follow up with the patient.  Sienna Salinas is a 42 year old female who presents with left thumb laceration.  Pt states was cutting bread with a serrated knife and cut her left thumb.  PT reports normal sensation and movement of left thumb.  Pt states pain level 2/10 and is described as tingling, achy.  Reports feeling well otherwise.    Exam as noted above.  The laceration was cleansed with saline and Hibiclens and was dressed with bacitracin and a Band-Aid.  Patient was instructed on wound healing and monitoring for infection.  Last tetanus was updated July 24 of 2015 and no need to update tetanus today.  Patient encouraged to follow-up if there are concerns of infection.    Discharge Medication List as of 8/17/2018  4:59 PM          Final diagnoses:   Thumb laceration, left, initial encounter - superficial       8/17/2018   [facility] EMERGENCY DEPARTMENT     Merle Garcia APRN CNP  08/17/18 [id number]

## 2018-10-01 ENCOUNTER — ALLIED HEALTH/NURSE VISIT (OUTPATIENT)
Dept: FAMILY MEDICINE | Facility: CLINIC | Age: 43
End: 2018-10-01
Payer: COMMERCIAL

## 2018-10-01 DIAGNOSIS — Z23 NEED FOR PROPHYLACTIC VACCINATION AND INOCULATION AGAINST INFLUENZA: Primary | ICD-10-CM

## 2018-10-01 PROCEDURE — 90686 IIV4 VACC NO PRSV 0.5 ML IM: CPT

## 2018-10-01 PROCEDURE — 90471 IMMUNIZATION ADMIN: CPT

## 2018-10-01 PROCEDURE — 99207 ZZC NO CHARGE NURSE ONLY: CPT

## 2018-10-01 NOTE — MR AVS SNAPSHOT
After Visit Summary   10/1/2018    Sienna Salinas    MRN: [id number]           Patient Information     Date Of Birth          1975        Visit Information        Provider Department      10/1/2018 1:45 PM [facility]        Today's Diagnoses     Need for prophylactic vaccination and inoculation against influenza    -  1       Follow-ups after your visit        Who to contact     If you have questions or need follow up information about today's clinic visit or your schedule please contact [facility] directly at [phone].  Normal or non-critical lab and imaging results will be communicated to you by [employer], letter or phone within 4 business days after the clinic has received the results. If you do not hear from us within 7 days, please contact the clinic through [employer] or phone. If you have a critical or abnormal lab result, we will notify you by phone as soon as possible.  Submit refill requests through [employer] or call your pharmacy and they will forward the refill request to us. Please allow 3 business days for your refill to be completed.          Additional Information About Your Visit        MyChart Information     [employer] gives you secure access to your electronic health record. If you see a primary care provider, you can also send messages to your care team and make appointments. If you have questions, please call your primary care clinic.  If you do not have a primary care provider, please call [phone] and they will assist you.        Care EveryWhere ID     This is your Care EveryWhere ID. This could be used by other organizations to access your [place] medical records  [id number]         Blood Pressure from Last 3 Encounters:   08/17/18 142/87   06/22/18 122/66   01/23/18 (!) 161/101    Weight from Last 3 Encounters:   06/22/18 286 lb 3.2 oz (129.8 kg)   01/23/18 276 lb (125.2 kg)   10/09/17 271 lb (122.9 kg)               We Performed the Following     FLU VACCINE, SPLIT VIRUS, IM (QUADRIVALENT) [51505]- >3 YRS     Vaccine Administration, Initial [01866]        Primary Care Provider Fax #    Physician No Ref-Primary [phone]       No address on file        Equal Access to Services     OCTAVIANOSERVANDO SAI : Imelda betty graff damaso Nash, wamunirda luqadaha, qahuseyinta kaalmada evgeny, taco adelitain hayaakristan sernadarby panchal roz prajapati. So [facility] [phone].    ATENCIÓN: Si habla español, tiene a zabala disposición servicios gratuitos de asistencia lingüística. Llame al [phone].    We comply with applicable federal civil rights laws and [place] laws. We do not discriminate on the basis of race, color, national origin, age, disability, sex, sexual orientation, or gender identity.            Thank you!     Thank you for choosing [facility]  for your care. Our goal is always to provide you with excellent care. Hearing back from our patients is one way we can continue to improve our services. Please take a few minutes to complete the written survey that you may receive in the mail after your visit with us. Thank you!             Your Updated Medication List - Protect others around you: Learn how to safely use, store and throw away your medicines at www.disposemymeds.org.          This list is accurate as of 10/1/18  1:46 PM.  Always use your most recent med list.                   Brand Name Dispense Instructions for use Diagnosis    calcium-vitamin D 500-125 MG-UNIT Tabs      Take 1 tablet by mouth daily        levonorgestrel 20 MCG/24HR IUD    MIRENA     1 each by Intrauterine route once        Magnesium 400 MG Tabs      Take 400 mg by mouth daily        Multi-vitamin Tabs tablet      Take 1 tablet by mouth daily        order for DME     1 Device    Equipment being ordered: wrist brace    Right arm pain

## 2018-10-01 NOTE — PROGRESS NOTES

## 2018-11-26 ENCOUNTER — OFFICE VISIT (OUTPATIENT)
Dept: FAMILY MEDICINE | Facility: CLINIC | Age: 43
End: 2018-11-26
Payer: COMMERCIAL

## 2018-11-26 VITALS
OXYGEN SATURATION: 99 % | BODY MASS INDEX: 45.82 KG/M2 | RESPIRATION RATE: 18 BRPM | TEMPERATURE: 97.6 F | HEART RATE: 86 BPM | HEIGHT: 65 IN | WEIGHT: 275 LBS | SYSTOLIC BLOOD PRESSURE: 132 MMHG | DIASTOLIC BLOOD PRESSURE: 84 MMHG

## 2018-11-26 DIAGNOSIS — M79.7 FIBROMYALGIA: Primary | ICD-10-CM

## 2018-11-26 PROCEDURE — 99214 OFFICE O/P EST MOD 30 MIN: CPT | Performed by: FAMILY MEDICINE

## 2018-11-26 RX ORDER — AMITRIPTYLINE HYDROCHLORIDE 10 MG/1
TABLET ORAL
Qty: 90 TABLET | Refills: 0 | Status: SHIPPED | OUTPATIENT
Start: 2018-11-26 | End: 2019-04-25

## 2018-11-26 ASSESSMENT — PAIN SCALES - GENERAL: PAINLEVEL: MILD PAIN (3)

## 2018-11-26 NOTE — MR AVS SNAPSHOT
After Visit Summary   11/26/2018    Sienna Salinas    MRN: [id number]           Patient Information     Date Of Birth          1975        Visit Information        Provider Department      11/26/2018 4:40 PM Guillermina Trevino MD [facility]        Today's Diagnoses     Fibromyalgia    -  1      Care Instructions          Thank you for choosing [facility].  You may be receiving a survey in the mail from [employer] regarding your visit today.  Please take a few minutes to complete and return the survey to let us know how we are doing.      Our Clinic hours are:  Mondays    7:20 am - 7 pm  Tues -  Fri  7:20 am - 5 pm    Clinic Phone: [phone]    The clinic lab opens at 7:30 am Mon - Fri and appointments are required.    [facility]. [phone]  Monday  8 am - 7pm  Tues - Fri 8 am - 5:30 pm                 Follow-ups after your visit        Your next 10 appointments already scheduled     Dec 20, 2018  3:40 PM CST   Telephone Visit with Guillermina Trevino MD   [facility]    [address] [facility] [address]   [phone]           Note: this is not an onsite visit; there is no need to come to the facility.              Who to contact     If you have questions or need follow up information about today's clinic visit or your schedule please contact [facility] directly at [phone].  Normal or non-critical lab and imaging results will be communicated to you by MyChart, letter or phone within 4 business days after the clinic has received the results. If you do not hear from us within 7 days, please contact the clinic through MyChart or phone. If you have a critical or abnormal lab result, we will notify you by phone as soon as possible.  Submit refill requests through [employer] or call your pharmacy and they will forward the refill request to us. Please allow 3  "business days for your refill to be completed.          Additional Information About Your Visit        [employer] Information     [employer] gives you secure access to your electronic health record. If you see a primary care provider, you can also send messages to your care team and make appointments. If you have questions, please call your primary care clinic.  If you do not have a primary care provider, please call [phone] and they will assist you.        Care EveryWhere ID     This is your Care EveryWhere ID. This could be used by other organizations to access your [facility] records  [id number]        Your Vitals Were     Pulse Temperature Respirations Height Pulse Oximetry Breastfeeding?    86 97.6  F (36.4  C) (Tympanic) 18 5' 5\" (1.651 m) 99% No    BMI (Body Mass Index)                   45.76 kg/m2            Blood Pressure from Last 3 Encounters:   11/26/18 132/84   08/17/18 142/87   06/22/18 122/66    Weight from Last 3 Encounters:   11/26/18 275 lb (124.7 kg)   06/22/18 286 lb 3.2 oz (129.8 kg)   01/23/18 276 lb (125.2 kg)              Today, you had the following     No orders found for display         Today's Medication Changes          These changes are accurate as of 11/26/18  5:28 PM.  If you have any questions, ask your nurse or doctor.               Start taking these medicines.        Dose/Directions    amitriptyline 10 MG tablet   Commonly known as:  ELAVIL   Used for:  Fibromyalgia   Started by:  Guillermina Trevino MD        Start at 1 tab at HS for 3 days, then 2 tabs atHS for 3 days, then 3 tabs at HS.   Quantity:  90 tablet   Refills:  0            Where to get your medicines      These medications were sent to [facility] – [place], [place] - [address]  [address], [facility] [address]     Phone:  [phone]     amitriptyline 10 MG tablet                Primary Care Provider Fax #    Physician No Ref-Primary [phone]       No " address on file        Equal Access to Services     [facility] : Hadii betty graff damaso Nash, waanahi dirkselamha, trev katyaxochitltika pepper, waxlatoya kareem berkowitzcallumidania prajapati. So [facility] [phone].    ATENCIÓN: Si habla español, tiene a zabala disposición servicios gratuitos de asistencia lingüística. Llame al [phone].    We comply with applicable federal civil rights laws and [place] laws. We do not discriminate on the basis of race, color, national origin, age, disability, sex, sexual orientation, or gender identity.            Thank you!     Thank you for choosing [facility] your [facility]. Our goal is always to provide you with excellent care. Hearing back from our patients is one way we can continue to improve our services. Please take a few minutes to complete the written survey that you may receive in the mail after your visit with us. Thank you!             Your Updated Medication List - Protect others around you: Learn how to safely use, store and throw away your medicines at www.disposemymeds.org.          This list is accurate as of 11/26/18  5:28 PM.  Always use your most recent med list.                   Brand Name Dispense Instructions for use Diagnosis    amitriptyline 10 MG tablet    ELAVIL    90 tablet    Start at 1 tab at HS for 3 days, then 2 tabs atHS for 3 days, then 3 tabs at HS.    Fibromyalgia       calcium-vitamin D 500-125 MG-UNIT Tabs      Take 1 tablet by mouth daily        levonorgestrel 20 MCG/24HR IUD    MIRENA     1 each by Intrauterine route once        Magnesium 400 MG Tabs      Take 400 mg by mouth daily        Multi-vitamin Tabs tablet      Take 1 tablet by mouth daily

## 2018-11-26 NOTE — PATIENT INSTRUCTIONS
Thank you for choosing [facility].  You may be receiving a survey in the mail from [facility] regarding your visit today.  Please take a few minutes to complete and return the survey to let us know how we are doing.      Our Clinic hours are:  Mondays    7:20 am - 7 pm  Tues - Fri  7:20 am - 5 pm    Clinic Phone: [phone]    The clinic lab opens at 7:30 am Mon - Fri and appointments are required.    [facility]. [phone]  Monday  8 am - 7pm  Tues - Fri 8 am - 5:30 pm

## 2018-11-26 NOTE — PROGRESS NOTES
"Chief Complaint   Patient presents with     Fibromyalgia     Patient stopped all medication for Fibromyalgia. Patient was taking tramadol, flexeril and gabapentin. Patient would like to discuss restarting medication. Patient rates pain at a 3/10 currently 7/10 at it's worse.        Has been off of all medication for Fibromyalgia for almost a year.    Now noticing she's more sore all the time.    Works at [employer] ([employer]  Has now been there x 2 months.  Gets a lot of steps/day - 17,000 so far today!  She does feel like she's getting enough water.  Sleep is a bit off as she's up at 3-4 am to get to work by 5 am.      \"it's nothing like before\". She is working with meditation, etc.   Feels like she's creeping toward having more problems - has been to the point in the past where she couldn't walk or lift her head.     /84  Pulse 86  Temp 97.6  F (36.4  C) (Tympanic)  Resp 18  Ht 5' 5\" (1.651 m)  Wt 275 lb (124.7 kg)  SpO2 99%  Breastfeeding? No  BMI 45.76 kg/m2  EXAM: GENERAL APPEARANCE ADULT: Alert, no acute distress  PSYCH: mentation appears normal., affect and mood normal    ASSESSMENT/PLAN:      ICD-10-CM    1. Fibromyalgia M79.7 amitriptyline (ELAVIL) 10 MG tablet     Discussed starting amitriptyline (has been on this before and felt it was helpful).  May help her sleep and her pain.  Will gradually titrate up.  Will recheck with a phone visit in a month.     Would avoid tramadol as studies have shown worse outcomes with narcotics/narcotic like analgesics.     Guillermina Trevino M.D.      Patient Instructions         Thank you for choosing [facility].  You may be receiving a survey in the mail from [employer] regarding your visit today.  Please take a few minutes to complete and return the survey to let us know how we are doing.      Our Clinic hours are:  Mondays    7:20 am - 7 pm  Tues -  Fri  7:20 am - 5 pm    Clinic Phone: [phone]    The clinic lab opens at 7:30 " am Mon - Fri and appointments are required.    [facility]  Ph. [phone]  Monday  8 am - 7pm  Tues - Fri 8 am - 5:30 pm

## 2018-12-19 NOTE — PROGRESS NOTES
"Sienna Salinas is a 43 year old female who is being evaluated via a billable telephone visit.      The patient has been notified of following:     \"This telephone visit will be conducted via a call between you and your physician/provider. We have found that certain health care needs can be provided without the need for a physical exam.  This service lets us provide the care you need with a short phone conversation.  If a prescription is necessary we can send it directly to your pharmacy.  If lab work is needed we can place an order for that and you can then stop by our lab to have the test done at a later time.    If during the course of the call the physician/provider feels a telephone visit is not appropriate, you will not be charged for this service.\"     Consent has been obtained for this service by 1 care team member: yes. See the scanned image in the medical record.    Sienna Salinas complains of    Chief Complaint   Patient presents with     Fibromyalgia     Patient feels like the amitriptyline is possibly making her foggy. Patient is only taking 2 tablets. Patient has been doing doing extra traveling and has some stressful situations going on and she would like to stay on 2 tablets for now. Patient rates pain currently 4/10.        I have reviewed and updated the patient's Past Medical History, Social History, Family History and Medication List.    ALLERGIES  Shellfish allergy; Iodine; and Sulfa drugs    Yolanda Hudson MA   (MA signature)    Chief Complaint   Patient presents with     Fibromyalgia     Patient feels like the amitriptyline is possibly making her foggy. Patient is only taking 2 tablets. Patient has been doing doing extra traveling and has some stressful situations going on and she would like to stay on 2 tablets for now. Patient rates pain currently 4/10.      Hasn't gone up past 2 tablets (20 mg) yet due to feeling foggy.  Mother in law just passed away, doing 10 hour days at " work.      Thinks the pain has been somewhat helped by the amitriptyline.  Has been taking some hot baths which also helps. Because of all the stress it's hard to tell if her body is just reacting to the stress.           Assessment/Plan:  (M79.7) Fibromyalgia  Comment:    Plan: amitriptyline (ELAVIL) 25 MG tablet        Increase to 25 mg daily - this is a dose she was previously on and would like to try again, she has done okay on 25 mg in the past.       I have reviewed the note as documented above.  This accurately captures the substance of my conversation with the patient.       Total time of call between patient and provider was 5 minutes      Guillermina Trevino M.D.

## 2018-12-20 ENCOUNTER — VIRTUAL VISIT (OUTPATIENT)
Dept: FAMILY MEDICINE | Facility: CLINIC | Age: 43
End: 2018-12-20
Payer: COMMERCIAL

## 2018-12-20 DIAGNOSIS — M79.7 FIBROMYALGIA: ICD-10-CM

## 2018-12-20 PROCEDURE — 99441 ZZC PHYSICIAN TELEPHONE EVALUATION 5-10 MIN: CPT | Performed by: FAMILY MEDICINE

## 2018-12-20 NOTE — PATIENT INSTRUCTIONS
Thank you for choosing [facility].  You may be receiving a survey in the mail from [facility] regarding your visit today.  Please take a few minutes to complete and return the survey to let us know how we are doing.      Our Clinic hours are:  Mondays    7:20 am - 7 pm  Tues - Fri  7:20 am - 5 pm    Clinic Phone: [phone]    The clinic lab opens at 7:30 am Mon - Fri and appointments are required.    [facility]. [phone]  Monday  8 am - 7pm  Tues - Fri 8 am - 5:30 pm

## 2019-04-24 ENCOUNTER — OFFICE VISIT (OUTPATIENT)
Dept: BEHAVIORAL HEALTH | Facility: CLINIC | Age: 44
End: 2019-04-24
Payer: COMMERCIAL

## 2019-04-24 DIAGNOSIS — F41.1 GAD (GENERALIZED ANXIETY DISORDER): ICD-10-CM

## 2019-04-24 DIAGNOSIS — F41.0 PANIC DISORDER WITHOUT AGORAPHOBIA: Primary | ICD-10-CM

## 2019-04-24 PROCEDURE — 90791 PSYCH DIAGNOSTIC EVALUATION: CPT | Performed by: SOCIAL WORKER
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
## 2019-04-25 ENCOUNTER — OFFICE VISIT (OUTPATIENT)
Dept: FAMILY MEDICINE | Facility: CLINIC | Age: 44
End: 2019-04-25
Payer: COMMERCIAL

## 2019-04-25 VITALS
OXYGEN SATURATION: 96 % | HEART RATE: 95 BPM | WEIGHT: 282 LBS | HEIGHT: 65 IN | TEMPERATURE: 97.6 F | BODY MASS INDEX: 46.98 KG/M2 | DIASTOLIC BLOOD PRESSURE: 84 MMHG | SYSTOLIC BLOOD PRESSURE: 134 MMHG

## 2019-04-25 DIAGNOSIS — F41.0 PANIC ATTACK: ICD-10-CM

## 2019-04-25 DIAGNOSIS — F41.1 GAD (GENERALIZED ANXIETY DISORDER): Primary | ICD-10-CM

## 2019-04-25 PROCEDURE — 99213 OFFICE O/P EST LOW 20 MIN: CPT | Performed by: NURSE PRACTITIONER

## 2019-04-25 RX ORDER — PAROXETINE 10 MG/1
10 TABLET, FILM COATED ORAL EVERY MORNING
Qty: 49 TABLET | Refills: 0 | Status: SHIPPED | OUTPATIENT
Start: 2019-04-25 | End: 2019-05-17 | Stop reason: DRUGHIGH

## 2019-04-25 RX ORDER — HYDROXYZINE HYDROCHLORIDE 25 MG/1
25 TABLET, FILM COATED ORAL 3 TIMES DAILY PRN
Qty: 30 TABLET | Refills: 3 | Status: SHIPPED | OUTPATIENT
Start: 2019-04-25 | End: 2020-09-23

## 2019-04-25 ASSESSMENT — ANXIETY QUESTIONNAIRES
6. BECOMING EASILY ANNOYED OR IRRITABLE: SEVERAL DAYS
GAD7 TOTAL SCORE: 16
GAD7 TOTAL SCORE: 9
5. BEING SO RESTLESS THAT IT IS HARD TO SIT STILL: MORE THAN HALF THE DAYS
2. NOT BEING ABLE TO STOP OR CONTROL WORRYING: SEVERAL DAYS
3. WORRYING TOO MUCH ABOUT DIFFERENT THINGS: NEARLY EVERY DAY
7. FEELING AFRAID AS IF SOMETHING AWFUL MIGHT HAPPEN: SEVERAL DAYS
IF YOU CHECKED OFF ANY PROBLEMS ON THIS QUESTIONNAIRE, HOW DIFFICULT HAVE THESE PROBLEMS MADE IT FOR YOU TO DO YOUR WORK, TAKE CARE OF THINGS AT HOME, OR GET ALONG WITH OTHER PEOPLE: NOT DIFFICULT AT ALL
7. FEELING AFRAID AS IF SOMETHING AWFUL MIGHT HAPPEN: NOT AT ALL
IF YOU CHECKED OFF ANY PROBLEMS ON THIS QUESTIONNAIRE, HOW DIFFICULT HAVE THESE PROBLEMS MADE IT FOR YOU TO DO YOUR WORK, TAKE CARE OF THINGS AT HOME, OR GET ALONG WITH OTHER PEOPLE: VERY DIFFICULT
3. WORRYING TOO MUCH ABOUT DIFFERENT THINGS: SEVERAL DAYS
2. NOT BEING ABLE TO STOP OR CONTROL WORRYING: MORE THAN HALF THE DAYS
1. FEELING NERVOUS, ANXIOUS, OR ON EDGE: NEARLY EVERY DAY
1. FEELING NERVOUS, ANXIOUS, OR ON EDGE: MORE THAN HALF THE DAYS
6. BECOMING EASILY ANNOYED OR IRRITABLE: MORE THAN HALF THE DAYS
5. BEING SO RESTLESS THAT IT IS HARD TO SIT STILL: MORE THAN HALF THE DAYS

## 2019-04-25 ASSESSMENT — PATIENT HEALTH QUESTIONNAIRE - PHQ9
SUM OF ALL RESPONSES TO PHQ QUESTIONS 1-9: 8
SUM OF ALL RESPONSES TO PHQ QUESTIONS 1-9: 6
5. POOR APPETITE OR OVEREATING: MORE THAN HALF THE DAYS
5. POOR APPETITE OR OVEREATING: NEARLY EVERY DAY

## 2019-04-25 ASSESSMENT — MIFFLIN-ST. JEOR: SCORE: 1940.02

## 2019-04-25 NOTE — PATIENT INSTRUCTIONS
1.  Start paxil 10 mg daily for one week and decrease amitriptyline to 1/2 tab the first week.  On the second week increase Paxil to 2 tabs and discontinue amitriptyline.  Continue this for the remainder of the month.  2.  Use Hydroxyzine as needed 3 times daily for panic attacks or increase in anxiety.  3.  Follow-up in one month for re-evaluation and refills.
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
## 2019-04-26 ASSESSMENT — ANXIETY QUESTIONNAIRES
GAD7 TOTAL SCORE: 9
GAD7 TOTAL SCORE: 16

## 2019-05-01 ENCOUNTER — OFFICE VISIT (OUTPATIENT)
Dept: BEHAVIORAL HEALTH | Facility: CLINIC | Age: 44
End: 2019-05-01
Payer: COMMERCIAL

## 2019-05-01 DIAGNOSIS — F41.0 PANIC DISORDER WITHOUT AGORAPHOBIA: Primary | ICD-10-CM

## 2019-05-01 DIAGNOSIS — F41.1 GAD (GENERALIZED ANXIETY DISORDER): ICD-10-CM

## 2019-05-01 PROCEDURE — 90834 PSYTX W PT 45 MINUTES: CPT | Performed by: SOCIAL WORKER

## 2019-05-02 ASSESSMENT — ANXIETY QUESTIONNAIRES
2. NOT BEING ABLE TO STOP OR CONTROL WORRYING: SEVERAL DAYS
3. WORRYING TOO MUCH ABOUT DIFFERENT THINGS: MORE THAN HALF THE DAYS
7. FEELING AFRAID AS IF SOMETHING AWFUL MIGHT HAPPEN: NOT AT ALL
GAD7 TOTAL SCORE: 8
1. FEELING NERVOUS, ANXIOUS, OR ON EDGE: MORE THAN HALF THE DAYS
5. BEING SO RESTLESS THAT IT IS HARD TO SIT STILL: SEVERAL DAYS
IF YOU CHECKED OFF ANY PROBLEMS ON THIS QUESTIONNAIRE, HOW DIFFICULT HAVE THESE PROBLEMS MADE IT FOR YOU TO DO YOUR WORK, TAKE CARE OF THINGS AT HOME, OR GET ALONG WITH OTHER PEOPLE: SOMEWHAT DIFFICULT
6. BECOMING EASILY ANNOYED OR IRRITABLE: SEVERAL DAYS

## 2019-05-02 ASSESSMENT — PATIENT HEALTH QUESTIONNAIRE - PHQ9
5. POOR APPETITE OR OVEREATING: SEVERAL DAYS
SUM OF ALL RESPONSES TO PHQ QUESTIONS 1-9: 5

## 2019-05-02 NOTE — PROGRESS NOTES
[facility]  May 2, 2019      Behavioral Health Clinician Progress Note    Patient Name: Sienna Salinas           Service Type: Individual      Service Location:  in clinic      Session Start Time: 210 pm  Session End Time: 3 pm      Session Length: 38 - 52      Attendees: Patient    Visit Activities (Refresh list every visit): [facility] Only    Diagnostic Assessment Date: 4/24/2019  Treatment Plan Review Date: not completed  See Flowsheets for today's PHQ-9 and SANJEEV-7 results  Previous PHQ-9:   PHQ-9 SCORE 2/26/2015 4/25/2019 4/25/2019   PHQ-9 Total Score 2 - -   PHQ-9 Total Score - 6 8     Previous SANJEEV-7:   SANJEEV-7 SCORE 4/25/2019 4/25/2019   Total Score 16 9       BASIL LEVEL:  BASIL Score (Last Two) 4/6/2015   BASIL Raw Score 41   Activation Score 63.2   BASIL Level 3       DATA  Extended Session (60+ minutes): No  Interactive Complexity: No  Crisis: No    Treatment Objective(s) Addressed in This Session:  Target Behavior(s): disease management/lifestyle changes decrease panic attacks and anxiety    Anxiety: will experience a reduction in anxiety, will develop more effective coping skills to manage anxiety symptoms, will develop healthy cognitive patterns and beliefs and will increase ability to function adaptively  Relationship Problems: will address relationship difficulties in a more adaptive manner  Functional Impairment: will effectively address problems that interfere with adaptive functioning  Grief / Loss: will increase understanding of normal grieving process, will engage in effective approach to address and resolve grief/loss issues and will process grief/loss issues in an adaptive manner  PTSD Symptom Management    Current Stressors / Issues:  Patient reports she is experiencing fewer symptoms than last session. She started medication and believes they are helping her. She had a final last night and is on a break from school. She did change her classed during the summer to online  "classes rather than classes to attend due to anxiety regarding driving.  Discussed avoidance and making small steps to notice urge and change behavior.  Patient also reports \"I have avoided grieving the loss of my mother\".  Will focus on small steps.  Discussed and practiced breathing exercises in session. Patient will return in 1-2 weeks.       Progress on Treatment Objective(s) / Homework:  New Objective established this session - PREPARATION (Decided to change - considering how); Intervened by negotiating a change plan and determining options / strategies for behavior change, identifying triggers, exploring social supports, and working towards setting a date to begin behavior change    Motivational Interviewing    MI Intervention: Co-Developed Goal: decrease avoidance, Expressed Empathy/Understanding, Permission to raise concern or advise, Open-ended questions, Reflections: simple and complex and Reframe     Change Talk Expressed by the Patient: Desire to change Reasons to change Need to change Committment to change    Provider Response to Change Talk: E - Evoked more info from patient about behavior change, A - Affirmed patient's thoughts, decisions, or attempts at behavior change, R - Reflected patient's change talk and S - Summarized patient's change talk statements      Care Plan review completed: No    Medication Review:  No changes to current psychiatric medication(s)    Medication Compliance:  Yes    Changes in Health Issues:   None reported    Chemical Use Review:   Substance Use: Chemical use reviewed, no active concerns identified      Tobacco Use: No current tobacco use.      Assessment: Current Emotional / Mental Status (status of significant symptoms):  Risk status (Self / Other harm or suicidal ideation)  Patient denies a history of suicidal ideation, suicide attempts, self-injurious behavior, homicidal ideation, homicidal behavior and and other safety concerns  Patient denies current fears or " concerns for personal safety.  Patient denies current or recent suicidal ideation or behaviors.  Patient denies current or recent homicidal ideation or behaviors.  Patient denies current or recent self injurious behavior or ideation.  Patient denies other safety concerns.  A safety and risk management plan has not been developed at this time, however patient was encouraged to call [facility] should there be a change in any of these risk factors.    Appearance:   Appropriate   Eye Contact:   Good   Psychomotor Behavior: Normal  Restless   Attitude:   Cooperative   Orientation:   All  Speech   Rate / Production: Normal    Volume:  Normal   Mood:    Anxious  Normal  Affect:    Appropriate   Thought Content:  Clear   Thought Form:  Coherent  Logical   Insight:    Good     Diagnoses:  1. Panic disorder without agoraphobia    2. SANJEEV (generalized anxiety disorder)        Collateral Reports Completed:  Communicated with: PCP as needed    Plan: (Homework, other):  Patient was given information about behavioral services and encouraged to schedule a follow up appointment with the clinic [facility] in 2 weeks.  She was also given deep Breathing Strategies to practice when experiencing anxiety.  CD Recommendations: No indications of CD issues.  NITZA Figueroa (Mindy),[facility]

## 2019-05-03 ASSESSMENT — ANXIETY QUESTIONNAIRES: GAD7 TOTAL SCORE: 8

## 2019-05-09 ENCOUNTER — OFFICE VISIT (OUTPATIENT)
Dept: BEHAVIORAL HEALTH | Facility: CLINIC | Age: 44
End: 2019-05-09
Payer: COMMERCIAL

## 2019-05-09 ENCOUNTER — HOSPITAL ENCOUNTER (OUTPATIENT)
Dept: MAMMOGRAPHY | Facility: CLINIC | Age: 44
Discharge: HOME | End: 2019-05-09
Attending: NURSE PRACTITIONER | Admitting: NURSE PRACTITIONER
Payer: COMMERCIAL

## 2019-05-09 DIAGNOSIS — F41.0 PANIC DISORDER WITHOUT AGORAPHOBIA: Primary | ICD-10-CM

## 2019-05-09 DIAGNOSIS — F41.1 GAD (GENERALIZED ANXIETY DISORDER): ICD-10-CM

## 2019-05-09 DIAGNOSIS — Z12.31 VISIT FOR SCREENING MAMMOGRAM: ICD-10-CM

## 2019-05-09 PROCEDURE — 90832 PSYTX W PT 30 MINUTES: CPT | Performed by: SOCIAL WORKER

## 2019-05-09 PROCEDURE — 77063 BREAST TOMOSYNTHESIS BI: CPT

## 2019-05-09 ASSESSMENT — ANXIETY QUESTIONNAIRES
GAD7 TOTAL SCORE: 3
3. WORRYING TOO MUCH ABOUT DIFFERENT THINGS: SEVERAL DAYS
7. FEELING AFRAID AS IF SOMETHING AWFUL MIGHT HAPPEN: NOT AT ALL
5. BEING SO RESTLESS THAT IT IS HARD TO SIT STILL: NOT AT ALL
IF YOU CHECKED OFF ANY PROBLEMS ON THIS QUESTIONNAIRE, HOW DIFFICULT HAVE THESE PROBLEMS MADE IT FOR YOU TO DO YOUR WORK, TAKE CARE OF THINGS AT HOME, OR GET ALONG WITH OTHER PEOPLE: NOT DIFFICULT AT ALL
1. FEELING NERVOUS, ANXIOUS, OR ON EDGE: SEVERAL DAYS
6. BECOMING EASILY ANNOYED OR IRRITABLE: NOT AT ALL
2. NOT BEING ABLE TO STOP OR CONTROL WORRYING: NOT AT ALL

## 2019-05-09 ASSESSMENT — PATIENT HEALTH QUESTIONNAIRE - PHQ9
SUM OF ALL RESPONSES TO PHQ QUESTIONS 1-9: 1
5. POOR APPETITE OR OVEREATING: SEVERAL DAYS

## 2019-05-10 ASSESSMENT — ANXIETY QUESTIONNAIRES: GAD7 TOTAL SCORE: 3

## 2019-05-17 ENCOUNTER — OFFICE VISIT (OUTPATIENT)
Dept: FAMILY MEDICINE | Facility: CLINIC | Age: 44
End: 2019-05-17
Payer: COMMERCIAL

## 2019-05-17 VITALS
BODY MASS INDEX: 45.88 KG/M2 | HEIGHT: 65 IN | SYSTOLIC BLOOD PRESSURE: 122 MMHG | RESPIRATION RATE: 16 BRPM | DIASTOLIC BLOOD PRESSURE: 70 MMHG | OXYGEN SATURATION: 97 % | WEIGHT: 275.4 LBS | HEART RATE: 89 BPM | TEMPERATURE: 97.4 F

## 2019-05-17 DIAGNOSIS — B37.31 YEAST VAGINITIS: ICD-10-CM

## 2019-05-17 DIAGNOSIS — J01.90 ACUTE SINUSITIS WITH SYMPTOMS > 10 DAYS: ICD-10-CM

## 2019-05-17 DIAGNOSIS — F41.1 GAD (GENERALIZED ANXIETY DISORDER): Primary | ICD-10-CM

## 2019-05-17 PROCEDURE — 99214 OFFICE O/P EST MOD 30 MIN: CPT | Performed by: NURSE PRACTITIONER

## 2019-05-17 RX ORDER — PAROXETINE 20 MG/1
20 TABLET, FILM COATED ORAL DAILY
COMMUNITY
Start: 2019-04-25 | End: 2019-05-17

## 2019-05-17 RX ORDER — FLUCONAZOLE 150 MG/1
150 TABLET ORAL ONCE
Qty: 1 TABLET | Refills: 0 | Status: SHIPPED | OUTPATIENT
Start: 2019-05-17 | End: 2019-05-17

## 2019-05-17 RX ORDER — PAROXETINE 20 MG/1
20 TABLET, FILM COATED ORAL DAILY
Qty: 90 TABLET | Refills: 3 | Status: SHIPPED | OUTPATIENT
Start: 2019-05-17 | End: 2019-07-11

## 2019-05-17 ASSESSMENT — MIFFLIN-ST. JEOR: SCORE: 1910.09

## 2019-05-17 ASSESSMENT — ANXIETY QUESTIONNAIRES
7. FEELING AFRAID AS IF SOMETHING AWFUL MIGHT HAPPEN: NOT AT ALL
2. NOT BEING ABLE TO STOP OR CONTROL WORRYING: NOT AT ALL
3. WORRYING TOO MUCH ABOUT DIFFERENT THINGS: NOT AT ALL
GAD7 TOTAL SCORE: 0
1. FEELING NERVOUS, ANXIOUS, OR ON EDGE: NOT AT ALL
6. BECOMING EASILY ANNOYED OR IRRITABLE: NOT AT ALL
5. BEING SO RESTLESS THAT IT IS HARD TO SIT STILL: NOT AT ALL

## 2019-05-17 ASSESSMENT — PATIENT HEALTH QUESTIONNAIRE - PHQ9
5. POOR APPETITE OR OVEREATING: NOT AT ALL
SUM OF ALL RESPONSES TO PHQ QUESTIONS 1-9: 1

## 2019-05-17 NOTE — PROGRESS NOTES
SUBJECTIVE:   Sienna Salinas is a 43 year old female who presents to clinic today for the following   health issues:      Anxiety Follow-Up    Status since last visit: Improved on new dose and medication.    Other associated symptoms:None    Complicating factors:   Significant life event: Yes-  Great Aunt is dying, other aunt has cancer.   Current substance abuse: None  Depression symptoms: No  SANJEEV-7 SCORE 5/2/2019 5/9/2019 5/17/2019   Total Score 8 3 0     PHQ-9 SCORE 5/2/2019 5/9/2019 5/17/2019   PHQ-9 Total Score - - -   PHQ-9 Total Score 5 1 1     SANJEEV-7    Amount of exercise or physical activity: 2-3 days/week for an average of 30-45 minutes    Problems taking medications regularly: No    Medication side effects: hungry more    Diet: regular (no restrictions)      ENT Symptoms             Symptoms: cc Present Absent Comment   Fever/Chills  x     Fatigue  x     Muscle Aches  x     Eye Irritation   x    Sneezing  x     Nasal Aureliano/Drg  x     Sinus Pressure/Pain x x     Loss of smell  x     Dental pain  x     Sore Throat  x  Not bad   Swollen Glands   x    Ear Pain/Fullness  x     Cough  x     Wheeze  x  Right side   Chest Pain  x  Right side   Shortness of breath  x  ?   Rash   x    Other         Symptom duration:  beginning of May   Symptom severity:  mod   Treatments tried:  Mucinex   Contacts:  was flying a lot.             Additional history: she states the Paxil has been working great, no side effects and wishes to continue.  She has had worsening congestion for weeks, can't shake it.    Reviewed  and updated as needed this visit by clinical staff  Tobacco  Allergies  Meds  Problems  Med Hx  Surg Hx  Fam Hx  Soc Hx          Reviewed and updated as needed this visit by Provider  Tobacco  Allergies  Meds  Problems  Med Hx  Surg Hx  Fam Hx         Patient Active Problem List   Diagnosis     Cervical high risk HPV (human papillomavirus) test positive     IUD (intrauterine device) in place      Fibromyalgia     Morbid obesity (H)     Positive JONATHAN (antinuclear antibody)     Other atopic dermatitis and related conditions     History of vitamin D deficiency     Insomnia     History reviewed. No pertinent surgical history.    Social History     Tobacco Use     Smoking status: Former Smoker     Smokeless tobacco: Former User     Tobacco comment: quit as a teen   Substance Use Topics     Alcohol use: Yes     Comment: social     Family History   Problem Relation Age of Onset     Diabetes Father      Kidney Disease Father      Alcohol/Drug Mother      Other - See Comments Mother         embolism lung     Diabetes Paternal Grandmother      Allergies Brother         Seasonal     Allergies Sister         Seasonal         Current Outpatient Medications   Medication Sig Dispense Refill     amoxicillin-clavulanate (AUGMENTIN) 875-125 MG tablet Take 1 tablet by mouth 2 times daily 20 tablet 0     calcium-vitamin D 500-125 MG-UNIT TABS Take 1 tablet by mouth daily        fluconazole (DIFLUCAN) 150 MG tablet Take 1 tablet (150 mg) by mouth once for 1 dose 1 tablet 0     hydrOXYzine (ATARAX) 25 MG tablet Take 1 tablet (25 mg) by mouth 3 times daily as needed for anxiety 30 tablet 3     levonorgestrel (MIRENA) 20 MCG/24HR IUD 1 each by Intrauterine route once       Magnesium 400 MG TABS Take 400 mg by mouth daily       multivitamin, therapeutic with minerals (MULTI-VITAMIN) TABS Take 1 tablet by mouth daily       PARoxetine (PAXIL) 20 MG tablet Take 1 tablet (20 mg) by mouth daily 90 tablet 3     amitriptyline (ELAVIL) 25 MG tablet Take 1 tablet (25 mg) by mouth At Bedtime (Patient not taking: Reported on 5/17/2019) 90 tablet 3     Allergies   Allergen Reactions     Shellfish Allergy Anaphylaxis     Iodine Hives     Sulfa Drugs Hives     And vomiting     BP Readings from Last 3 Encounters:   05/17/19 122/70   04/25/19 134/84   11/26/18 132/84    Wt Readings from Last 3 Encounters:   05/17/19 124.9 kg (275 lb 6.4 oz)  "  04/25/19 127.9 kg (282 lb)   11/26/18 124.7 kg (275 lb)                     ROS: 10 point ROS neg other than the symptoms noted above in the HPI.    OBJECTIVE:     /70 (BP Location: Right arm, Patient Position: Chair, Cuff Size: Adult Large)   Pulse 89   Temp 97.4  F (36.3  C) (Tympanic)   Resp 16   Ht 1.651 m (5' 5\")   Wt 124.9 kg (275 lb 6.4 oz)   LMP  (LMP Unknown)   SpO2 97%   BMI 45.83 kg/m    Body mass index is 45.83 kg/m .  GENERAL: healthy, alert and no distress  HENT: ear canals and TM's normal, pharynx with moderate erythema, positive sinus tenderness, worse on left  NECK: no adenopathy, no asymmetry  RESP: lungs clear to auscultation - no rales, rhonchi or wheezes  CV: regular rate and rhythm, normal S1 S2, no S3 or S4, no murmur  MS: no gross musculoskeletal defects noted  PSYCH: pleasant,talkative    Diagnostic Test Results:  No results found for this or any previous visit (from the past 24 hour(s)).    ASSESSMENT/PLAN:             1. SANJEEV (generalized anxiety disorder)    - PARoxetine (PAXIL) 20 MG tablet; Take 1 tablet (20 mg) by mouth daily  Dispense: 90 tablet; Refill: 3  Doing well, refills sent, continue same dose.     2. Acute sinusitis with symptoms > 10 days    - amoxicillin-clavulanate (AUGMENTIN) 875-125 MG tablet; Take 1 tablet by mouth 2 times daily  Dispense: 20 tablet; Refill: 0  Discussed how to take the medication(s), expected outcomes, potential side effects.    3. Yeast vaginitis    - fluconazole (DIFLUCAN) 150 MG tablet; Take 1 tablet (150 mg) by mouth once for 1 dose  Dispense: 1 tablet; Refill: 0  Discussed how to take the medication(s), expected outcomes, potential side effects.    4. BMI 45.0-49.9, adult (H)    Reviewed healthy diet, exercise.      See Patient Instructions  Patient Instructions   Take the antibiotic as prescribed for sinuses.  Continue with Paxil, refills sent.  Return fasting for general physical in the fall.    Our Clinic hours are:  Mondays    " 7:20 am - 7 pm  Tues -  Fri  7:20 am - 5 pm    Clinic Phone: [phone]    The clinic lab opens at 7:30 am Mon - Fri and appointments are required.    [facility]  Ph. [phone]  Monday  8 am - 7pm  Tues - Fri 8 am - 5:30 pm             ZULAY Verma [facility]

## 2019-05-17 NOTE — PATIENT INSTRUCTIONS
Take the antibiotic as prescribed for sinuses.  Continue with Paxil, refills sent.  Return fasting for general physical in the fall.    Our Clinic hours are:  Mondays    7:20 am - 7 pm  Tues -  Fri  7:20 am - 5 pm    Clinic Phone: [phone]    The clinic lab opens at 7:30 am Mon - Fri and appointments are required.    [facility]. [phone]  Monday  8 am - 7pm  Tues - Fri 8 am - 5:30 pm

## 2019-05-18 ASSESSMENT — ANXIETY QUESTIONNAIRES: GAD7 TOTAL SCORE: 0

## 2019-05-20 ENCOUNTER — TELEPHONE (OUTPATIENT)
Dept: FAMILY MEDICINE | Facility: CLINIC | Age: 44
End: 2019-05-20

## 2019-05-20 DIAGNOSIS — J01.90 ACUTE SINUSITIS WITH SYMPTOMS > 10 DAYS: Primary | ICD-10-CM

## 2019-05-20 RX ORDER — DOXYCYCLINE HYCLATE 100 MG
100 TABLET ORAL 2 TIMES DAILY
Qty: 20 TABLET | Refills: 0 | Status: SHIPPED | OUTPATIENT
Start: 2019-05-20 | End: 2019-05-30

## 2019-05-20 NOTE — TELEPHONE ENCOUNTER
Stop Augmentin and add to allergy list.  Try Doxycyline 100 mg 1 po twice daily x 10 days #20/NR.  Limit sun exposure.  If diarrhea persists or any other concerns, will need to be seen in clinic.  MAEVE Kee

## 2019-05-20 NOTE — TELEPHONE ENCOUNTER
S-(situation): Patient states she has watery diarrhea.    B-(background): Patient started the antibiotic on Friday and continues to have diarrhea.    A-(assessment): patient reports she has had watery diarrhea. Patient had about 5 episodes a day. Patient is not sure about color of the stool.  Patient took Imodium. Patient states it has improved slightly.  Patient refused to start probiotic because it makes her gassy. Patient reports she does eat yogurt.  Patient refuses to take antibiotic.  Patient would like a different one.      R-(recommendations): Will send to provider to review and advise.    Thank you  Skyla OLIVIA RN

## 2019-05-20 NOTE — TELEPHONE ENCOUNTER
Reason for Call: diarrhea very watery    Detailed comments: patient is calling stating that she saw GAVINO Grider Friday and got  Augmentin for a sinus infection and all weekend she has had very watery diarrhea. Should she get something else? Uses our pharmacy. Please advise.    Phone Number Patient can be reached at: Home number on file [phone] (home)    Best Time: any    Can we leave a detailed message on this number? YES   Damaris Alba  [facility] [place] [facility]      Call taken on 5/20/2019 at 9:57 AM by Damaris Alba

## 2019-05-21 PROBLEM — F41.1 GAD (GENERALIZED ANXIETY DISORDER): Status: ACTIVE | Noted: 2019-05-21

## 2019-05-21 NOTE — PROGRESS NOTES
[facility]  May 9, 2019      Behavioral Health Clinician Progress Note    Patient Name: Sienna Salinas           Service Type: Individual      Service Location:  in clinic      Session Start Time: 9 am  Session End Time: 930 am      Session Length: 16 - 37      Attendees: Patient    Visit Activities (Refresh list every visit): [facility] Only    Diagnostic Assessment Date: 4/24/2019  Treatment Plan Review Date: 8/9/2019  See Flowsheets for today's PHQ-9 and SANJEEV-7 results  Previous PHQ-9:   PHQ-9 SCORE 5/2/2019 5/9/2019 5/17/2019   PHQ-9 Total Score - - -   PHQ-9 Total Score 5 1 1     Previous SANJEEV-7:   SANJEEV-7 SCORE 5/2/2019 5/9/2019 5/17/2019   Total Score 8 3 0       BASIL LEVEL:  BASIL Score (Last Two) 4/6/2015 5/2/2019   BASIL Raw Score 41 37   Activation Score 63.2 79.2   BASIL Level 3 4       DATA  Extended Session (60+ minutes): No  Interactive Complexity: No  Crisis: No    Treatment Objective(s) Addressed in This Session:  Target Behavior(s): disease management/lifestyle changes decrease panic attacks and anxiety    Anxiety: will experience a reduction in anxiety, will develop more effective coping skills to manage anxiety symptoms, will develop healthy cognitive patterns and beliefs and will increase ability to function adaptively  Relationship Problems: will address relationship difficulties in a more adaptive manner  Functional Impairment: will effectively address problems that interfere with adaptive functioning  Grief / Loss: will increase understanding of normal grieving process, will engage in effective approach to address and resolve grief/loss issues and will process grief/loss issues in an adaptive manner  PTSD Symptom Management    Current Stressors / Issues:  Patient reports she continues to experience less symptoms of panic and anxiety.  She has been driving on roads that are not interstate highways and it is going well. At this time, driving on major highways is not a concern.  Patient just returned from [place] for [facility] service for her mother in law. Discussed patient's avoidance of grieving for her mother and making small steps towards processing it.  Reviewed  and practiced breathing exercises in session. Patient will return in 1 month or earlier as needed.        Progress on Treatment Objective(s) / Homework:  Satisfactory progress - PREPARATION (Decided to change - considering how); Intervened by negotiating a change plan and determining options / strategies for behavior change, identifying triggers, exploring social supports, and working towards setting a date to begin behavior change    Motivational Interviewing    MI Intervention: Co-Developed Goal: decrease avoidance, Expressed Empathy/Understanding, Permission to raise concern or advise, Open-ended questions, Reflections: simple and complex and Reframe     Change Talk Expressed by the Patient: Desire to change Reasons to change Need to change Committment to change    Provider Response to Change Talk: E - Evoked more info from patient about behavior change, A - Affirmed patient's thoughts, decisions, or attempts at behavior change, R - Reflected patient's change talk and S - Summarized patient's change talk statements      Care Plan review completed: No    Medication Review:  No changes to current psychiatric medication(s)    Medication Compliance:  Yes    Changes in Health Issues:   None reported    Chemical Use Review:   Substance Use: Chemical use reviewed, no active concerns identified      Tobacco Use: No current tobacco use.      Assessment: Current Emotional / Mental Status (status of significant symptoms):  Risk status (Self / Other harm or suicidal ideation)  Patient denies a history of suicidal ideation, suicide attempts, self-injurious behavior, homicidal ideation, homicidal behavior and and other safety concerns  Patient denies current fears or concerns for personal safety.  Patient denies current or recent suicidal  ideation or behaviors.  Patient denies current or recent homicidal ideation or behaviors.  Patient denies current or recent self injurious behavior or ideation.  Patient denies other safety concerns.  A safety and risk management plan has not been developed at this time, however patient was encouraged to call [place] [phone] should there be a change in any of these risk factors.    Appearance:   Appropriate   Eye Contact:   Good   Psychomotor Behavior: Normal  Restless   Attitude:   Cooperative   Orientation:   All  Speech   Rate / Production: Normal    Volume:  Normal   Mood:    Anxious  Normal  Affect:    Appropriate   Thought Content:  Clear   Thought Form:  Coherent  Logical   Insight:    Good     Diagnoses:  1. Panic disorder without agoraphobia    2. SANJEEV (generalized anxiety disorder)        Collateral Reports Completed:  Communicated with: PCP as needed    Plan: (Homework, other):  Patient was  encouraged to schedule a follow up appointment with the clinic [facility] in 1 month.  She was also given deep Breathing Strategies to practice when experiencing anxiety.  CD Recommendations: No indications of CD issues.  Figueroa (Mindy),NITZA,[facility]    ______________________________________________________________________    Integrated Primary Care Behavioral Health Treatment Plan    Patient's Name: Sienna Salinas  YOB: 1975    Date: May 9, 2019    DSM-V Diagnoses: 300.01 (F41.0) Panic Disorder  300.02 (F41.1) Generalized Anxiety Disorder  Psychosocial / Contextual Factors: history of trauma  WHODAS: see DA    Referral / Collaboration:  Referral to another professional/service is not indicated at this time.  Will continue to assess as needed.    Anticipated number of session or this episode of care: 3-8      MeasurableTreatment Goal(s) related to diagnosis / functional impairment(s)  Goal 1: Patient will decrease anxiety and panic attacks by at least 50% as indicated by [id number] and self-report.      I will know I've met my goal when I stop avoiding things that make me have panic attacks.      Objective #A (Patient Action)    Patient will identify 3 fears / thoughts that contribute to feeling anxious  Status: New - Date: 5/9/2019     Intervention(s)  [facility] will assign homework as discussed in session  teach emotional recognition/identification. recognize thoughts and events that lead to increased fear - increase ability to manage intense emotions.    Objective #B  Patient will identify 3 initial signs or symptoms of anxiety  Status: New - Date: 5/9/2019     Intervention(s)  [facility] will assign homework discussed in session  teach distraction skills. to include breathing and relaxation skills.    Patient has reviewed and agreed to the above plan.    Written by  NITZA Figueroa (Mindy),[facility]

## 2019-06-10 ENCOUNTER — TRANSFERRED RECORDS (OUTPATIENT)
Dept: HEALTH INFORMATION MANAGEMENT | Facility: CLINIC | Age: 44
End: 2019-06-10

## 2019-07-10 DIAGNOSIS — F41.1 GAD (GENERALIZED ANXIETY DISORDER): ICD-10-CM

## 2019-07-10 NOTE — TELEPHONE ENCOUNTER
"Requested Prescriptions   Pending Prescriptions Disp Refills     PARoxetine (PAXIL) 20 MG tablet 90 tablet 3     Sig: Take 1 tablet (20 mg) by mouth daily   Last Written Prescription Date:  5/17/19  Last Fill Quantity: 90 tab,  # refills: 3   Last office visit: 5/17/2019 with prescribing provider:  Sienna Grider     Future Office Visit:   Next 5 appointments (look out 90 days)    Sep 06, 2019  9:40 AM CDT  PHYSICAL with ZULAY Verma CNP  [facility] [address] [facility] [address]  [phone]             SSRIs Protocol Passed - 7/10/2019  8:51 AM        Passed - Recent (12 mo) or future (30 days) visit within the authorizing provider's specialty     Patient had office visit in the last 12 months or has a visit in the next 30 days with authorizing provider or within the authorizing provider's specialty.  See \"Patient Info\" tab in inbasket, or \"Choose Columns\" in Meds & Orders section of the refill encounter.              Passed - Medication is active on med list        Passed - Patient is age 18 or older        Passed - No active pregnancy on record        Passed - No positive pregnancy test in last 12 months          "

## 2019-07-11 RX ORDER — PAROXETINE 20 MG/1
20 TABLET, FILM COATED ORAL DAILY
Qty: 90 TABLET | Refills: 2 | Status: SHIPPED | OUTPATIENT
Start: 2019-07-11 | End: 2020-03-18

## 2019-09-06 ENCOUNTER — OFFICE VISIT (OUTPATIENT)
Dept: FAMILY MEDICINE | Facility: CLINIC | Age: 44
End: 2019-09-06
Payer: COMMERCIAL

## 2019-09-06 VITALS
HEART RATE: 94 BPM | RESPIRATION RATE: 16 BRPM | SYSTOLIC BLOOD PRESSURE: 128 MMHG | WEIGHT: 285 LBS | DIASTOLIC BLOOD PRESSURE: 78 MMHG | BODY MASS INDEX: 47.48 KG/M2 | OXYGEN SATURATION: 97 % | TEMPERATURE: 97.8 F | HEIGHT: 65 IN

## 2019-09-06 DIAGNOSIS — Z13.1 SCREENING FOR DIABETES MELLITUS: ICD-10-CM

## 2019-09-06 DIAGNOSIS — D35.2 PITUITARY ADENOMA (H): ICD-10-CM

## 2019-09-06 DIAGNOSIS — Z00.00 ROUTINE GENERAL MEDICAL EXAMINATION AT A HEALTH CARE FACILITY: Primary | ICD-10-CM

## 2019-09-06 DIAGNOSIS — F41.1 GENERALIZED ANXIETY DISORDER: ICD-10-CM

## 2019-09-06 DIAGNOSIS — Z83.2 FAMILY HISTORY OF BLOOD COAGULATION DISORDER: ICD-10-CM

## 2019-09-06 DIAGNOSIS — Z13.220 SCREENING FOR LIPOID DISORDERS: ICD-10-CM

## 2019-09-06 PROCEDURE — 99396 PREV VISIT EST AGE 40-64: CPT | Performed by: NURSE PRACTITIONER

## 2019-09-06 ASSESSMENT — MIFFLIN-ST. JEOR: SCORE: 1953.63

## 2019-09-06 NOTE — PATIENT INSTRUCTIONS
Return for fasting labs and flu shot.    Preventive Health Recommendations  Female Ages 40 to 49    Yearly exam:     See your health care provider every year in order to  1. Review health changes.   2. Discuss preventive care.    3. Review your medicines if your doctor prescribed any.      Get a Pap test every three years (unless you have an abnormal result and your provider advises testing more often).      If you get Pap tests with HPV test, you only need to test every 5 years, unless you have an abnormal result. You do not need a Pap test if your uterus was removed (hysterectomy) and you have not had cancer.      You should be tested each year for STDs (sexually transmitted diseases), if you're at risk.     Ask your doctor if you should have a mammogram.      Have a colonoscopy (test for colon cancer) if someone in your family has had colon cancer or polyps before age 50.       Have a cholesterol test every 5 years.       Have a diabetes test (fasting glucose) after age 45. If you are at risk for diabetes, you should have this test every 3 years.    Shots: Get a flu shot each year. Get a tetanus shot every 10 years.     Nutrition:     Eat at least 5 servings of fruits and vegetables each day.    Eat whole-grain bread, whole-wheat pasta and brown rice instead of white grains and rice.    Get adequate Calcium and Vitamin D.      Lifestyle    Exercise at least 150 minutes a week (an average of 30 minutes a day, 5 days a week). This will help you control your weight and prevent disease.    Limit alcohol to one drink per day.    No smoking.     Wear sunscreen to prevent skin cancer.    See your dentist every six months for an exam and cleaning.
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
## 2019-09-10 DIAGNOSIS — Z83.2 FAMILY HISTORY OF BLOOD COAGULATION DISORDER: ICD-10-CM

## 2019-09-10 DIAGNOSIS — Z13.220 SCREENING FOR LIPOID DISORDERS: ICD-10-CM

## 2019-09-10 DIAGNOSIS — Z13.1 SCREENING FOR DIABETES MELLITUS: ICD-10-CM

## 2019-09-10 LAB
ANION GAP SERPL CALCULATED.3IONS-SCNC: 7 MMOL/L (ref 3–14)
BUN SERPL-MCNC: 10 MG/DL (ref 7–30)
CALCIUM SERPL-MCNC: 8.8 MG/DL (ref 8.5–10.1)
CHLORIDE SERPL-SCNC: 105 MMOL/L (ref 94–109)
CHOLEST SERPL-MCNC: 237 MG/DL
CO2 SERPL-SCNC: 26 MMOL/L (ref 20–32)
CREAT SERPL-MCNC: 0.74 MG/DL (ref 0.52–1.04)
GFR SERPL CREATININE-BSD FRML MDRD: >90 ML/MIN/{1.73_M2}
GLUCOSE SERPL-MCNC: 106 MG/DL (ref 70–99)
HDLC SERPL-MCNC: 74 MG/DL
LDLC SERPL CALC-MCNC: 136 MG/DL
NONHDLC SERPL-MCNC: 163 MG/DL
POTASSIUM SERPL-SCNC: 4.1 MMOL/L (ref 3.4–5.3)
SODIUM SERPL-SCNC: 138 MMOL/L (ref 133–144)
TRIGL SERPL-MCNC: 134 MG/DL

## 2019-09-10 PROCEDURE — 80048 BASIC METABOLIC PNL TOTAL CA: CPT | Performed by: NURSE PRACTITIONER

## 2019-09-10 PROCEDURE — 36415 COLL VENOUS BLD VENIPUNCTURE: CPT | Performed by: NURSE PRACTITIONER

## 2019-09-10 PROCEDURE — 85300 ANTITHROMBIN III ACTIVITY: CPT | Performed by: NURSE PRACTITIONER

## 2019-09-10 PROCEDURE — 80061 LIPID PANEL: CPT | Performed by: NURSE PRACTITIONER

## 2019-09-11 LAB — AT III ACT/NOR PPP CHRO: 98 % (ref 85–135)

## 2019-09-28 ENCOUNTER — HEALTH MAINTENANCE LETTER (OUTPATIENT)
Age: 44
End: 2019-09-28

## 2019-12-13 ENCOUNTER — OFFICE VISIT (OUTPATIENT)
Dept: FAMILY MEDICINE | Facility: CLINIC | Age: 44
End: 2019-12-13
Payer: COMMERCIAL

## 2019-12-13 VITALS
SYSTOLIC BLOOD PRESSURE: 128 MMHG | HEART RATE: 40 BPM | RESPIRATION RATE: 12 BRPM | DIASTOLIC BLOOD PRESSURE: 70 MMHG | TEMPERATURE: 97.8 F | OXYGEN SATURATION: 98 %

## 2019-12-13 DIAGNOSIS — H10.9 BACTERIAL CONJUNCTIVITIS OF RIGHT EYE: Primary | ICD-10-CM

## 2019-12-13 DIAGNOSIS — Z23 NEED FOR PROPHYLACTIC VACCINATION AND INOCULATION AGAINST INFLUENZA: ICD-10-CM

## 2019-12-13 PROCEDURE — 90686 IIV4 VACC NO PRSV 0.5 ML IM: CPT | Performed by: NURSE PRACTITIONER

## 2019-12-13 PROCEDURE — 90471 IMMUNIZATION ADMIN: CPT | Performed by: NURSE PRACTITIONER

## 2019-12-13 PROCEDURE — 99213 OFFICE O/P EST LOW 20 MIN: CPT | Mod: 25 | Performed by: NURSE PRACTITIONER

## 2019-12-13 RX ORDER — OFLOXACIN 3 MG/ML
1 SOLUTION/ DROPS OPHTHALMIC
Qty: 10 ML | Refills: 0 | Status: SHIPPED | OUTPATIENT
Start: 2019-12-13 | End: 2020-09-23

## 2019-12-13 RX ORDER — CIPROFLOXACIN HYDROCHLORIDE 3.5 MG/ML
1 SOLUTION/ DROPS TOPICAL EVERY 4 HOURS
Qty: 10 ML | Refills: 0 | Status: SHIPPED | OUTPATIENT
Start: 2019-12-13 | End: 2019-12-13

## 2019-12-13 NOTE — PROGRESS NOTES
Subjective     Sienna Salinas is a 44 year old female who presents to clinic today for the following health issues:    HPI   Eye(s) Problem  Onset: 3 days     Description:   Location: right  Pain: YES and itching   Redness: YES    Accompanying Signs & Symptoms:  Discharge/mattering: YES  Swelling: YES  Visual changes: no   Fever: no   Nasal Congestion: YES  Bothered by bright lights: no    History:   Trauma: no   Foreign body exposure: no     Precipitating factors:   Wearing contacts: YES    Alleviating factors:  Improved by: hot compress    Therapies Tried and outcome: listed above       Patient Active Problem List   Diagnosis     Cervical high risk HPV (human papillomavirus) test positive     IUD (intrauterine device) in place     Fibromyalgia     Morbid obesity (H)     Positive JONATHAN (antinuclear antibody)     Other atopic dermatitis and related conditions     History of vitamin D deficiency     Insomnia     SANJEEV (generalized anxiety disorder)     Pituitary adenoma (H)     Generalized anxiety disorder     Past Surgical History:   Procedure Laterality Date     M-PACT BSP BONE SPUR L      left toe     right knee arthroscopy      torn meniscus       Social History     Tobacco Use     Smoking status: Former Smoker     Smokeless tobacco: Former User     Tobacco comment: quit as a teen   Substance Use Topics     Alcohol use: Yes     Comment: social     Family History   Problem Relation Age of Onset     Diabetes Father      Kidney Disease Father      Alcohol/Drug Mother      Other - See Comments Mother         embolism lung     Blood Disease Mother         AT3 blood clotting disorder     Diabetes Paternal Grandmother      Allergies Brother         Seasonal     Allergies Sister         Seasonal             Reviewed and updated as needed this visit by Provider         Review of Systems   ROS COMP: Constitutional, HEENT, cardiovascular, pulmonary, gi and gu systems are negative, except as otherwise noted.      Objective  "   /70 (BP Location: Right arm, Patient Position: Sitting, Cuff Size: Adult Large)   Pulse (!) 40   Temp 97.8  F (36.6  C) (Tympanic)   Resp 12   SpO2 98%   There is no height or weight on file to calculate BMI.  Physical Exam   GENERAL: healthy, alert and no distress  EYES: PERRL, EOMI and conjunctiva/corneas- conjunctival injection OD and yellow colored discharge present right  HENT: normal cephalic/atraumatic, ear canals and TM's normal, nose and mouth without ulcers or lesions, oropharynx clear, oral mucous membranes moist and sinuses: not tender  NECK: no adenopathy, no asymmetry, masses, or scars and thyroid normal to palpation  RESP: lungs clear to auscultation - no rales, rhonchi or wheezes  CV: regular rates and rhythm, normal S1 S2, no S3 or S4 and no murmur, click or rub  MS: no gross musculoskeletal defects noted, no edema  SKIN: no suspicious lesions or rashes  NEURO: Normal strength and tone, mentation intact and speech normal    Diagnostic Test Results:  Labs reviewed in Epic        Assessment & Plan       ICD-10-CM    1. Bacterial conjunctivitis of right eye H10.9 ofloxacin (OCUFLOX) 0.3 % ophthalmic solution        2. Need for prophylactic vaccination and inoculation against influenza Z23 INFLUENZA VACCINE IM > 6 MONTHS VALENT IIV4 [04005]     Vaccine Administration, Initial [92215]        BMI:   Estimated body mass index is 47.43 kg/m  as calculated from the following:    Height as of 9/6/19: 1.651 m (5' 5\").    Weight as of 9/6/19: 129.3 kg (285 lb).       FUTURE APPOINTMENTS:       - Follow up in 2-3 days for symptoms that are not improving, sooner for new or worsening sx.     Patient Instructions     Patient Education     Bacterial Conjunctivitis    You have an infection in the membranes covering the white part of the eye. This part of the eye is called the conjunctiva. The infection is called conjunctivitis. The most common symptoms of conjunctivitis include a thick, pus-like " discharge from the eye, swollen eyelids, redness, eyelids sticking together upon awakening, and a gritty or scratchy feeling in the eye. Your infection was caused by bacteria. It may be treated with medicine. With treatment, the infection takes about 7 to 10 days to resolve.  Home care    Use prescribed antibiotic eye drops or ointment as directed to treat the infection.    Apply a warm compress (towel soaked in warm water) to the affected eye 3 to 4 times a day. Do this just before applying medicine to the eye.    Use a warm, wet cloth to wipe away crusting of the eyelids in the morning. This is caused by mucus drainage during the night. You may also use saline irrigating solution or artificial tears to rinse away mucus in the eye. Do not put a patch over the eye.    Wash your hands before and after touching the infected eye. This is to prevent spreading the infection to the other eye, and to other people. Don't share your towels or washcloths with others.    You may use acetaminophen or ibuprofen to control pain, unless another medicine was prescribed. (Note: If you have chronic liver or kidney disease or have ever had a stomach ulcer or gastrointestinal bleeding, talk with your doctor before using these medicines.)    Don't wear contact lenses until your eyes have healed and all symptoms are gone.  Follow-up care  Follow up with your healthcare provider, or as advised.  When to seek medical advice  Call your healthcare provider right away if any of these occur:    Worsening vision    Increasing pain in the eye    Increasing swelling or redness of the eyelid    Redness spreading around the eye  Date Last Reviewed: 7/1/2017 2000-2018 [employer]. [address] [place] [address]. All rights reserved. This information is not intended as a substitute for professional medical care. Always follow your healthcare professional's instructions.               No follow-ups on file.    Izzy Pool  ZULAY Souza [facility]

## 2019-12-13 NOTE — PATIENT INSTRUCTIONS
Patient Education     Bacterial Conjunctivitis    You have an infection in the membranes covering the white part of the eye. This part of the eye is called the conjunctiva. The infection is called conjunctivitis. The most common symptoms of conjunctivitis include a thick, pus-like discharge from the eye, swollen eyelids, redness, eyelids sticking together upon awakening, and a gritty or scratchy feeling in the eye. Your infection was caused by bacteria. It may be treated with medicine. With treatment, the infection takes about 7 to 10 days to resolve.  Home care    Use prescribed antibiotic eye drops or ointment as directed to treat the infection.    Apply a warm compress (towel soaked in warm water) to the affected eye 3 to 4 times a day. Do this just before applying medicine to the eye.    Use a warm, wet cloth to wipe away crusting of the eyelids in the morning. This is caused by mucus drainage during the night. You may also use saline irrigating solution or artificial tears to rinse away mucus in the eye. Do not put a patch over the eye.    Wash your hands before and after touching the infected eye. This is to prevent spreading the infection to the other eye, and to other people. Don't share your towels or washcloths with others.    You may use acetaminophen or ibuprofen to control pain, unless another medicine was prescribed. (Note: If you have chronic liver or kidney disease or have ever had a stomach ulcer or gastrointestinal bleeding, talk with your doctor before using these medicines.)    Don't wear contact lenses until your eyes have healed and all symptoms are gone.  Follow-up care  Follow up with your healthcare provider, or as advised.  When to seek medical advice  Call your healthcare provider right away if any of these occur:    Worsening vision    Increasing pain in the eye    Increasing swelling or redness of the eyelid    Redness spreading around the eye  Date Last Reviewed: 7/1/2017 2000-2018  [employer]. [address] [place] [address], [place] [address]. All rights reserved. This information is not intended as a substitute for professional medical care. Always follow your healthcare professional's instructions.

## 2020-03-16 DIAGNOSIS — F41.1 GAD (GENERALIZED ANXIETY DISORDER): ICD-10-CM

## 2020-03-17 NOTE — TELEPHONE ENCOUNTER
"Requested Prescriptions   Pending Prescriptions Disp Refills     PARoxetine (PAXIL) 20 MG tablet [Pharmacy Med Name: PAROXETINE HCL TABS 20MG] 90 tablet 3     Sig: TAKE 1 TABLET DAILY       SSRIs Protocol Passed - 3/16/2020  8:56 PM  SANJEEV-7 SCORE 5/2/2019 5/9/2019 5/17/2019   Total Score 8 3 0     PHQ 5/2/2019 5/9/2019 5/17/2019   PHQ-9 Total Score 5 1 1   Q9: Thoughts of better off dead/self-harm past 2 weeks Not at all Not at all Not at all           Passed - Recent (12 mo) or future (30 days) visit within the authorizing provider's specialty     Patient has had an office visit with the authorizing provider or a provider within the authorizing providers department within the previous 12 mos or has a future within next 30 days. See \"Patient Info\" tab in inbasket, or \"Choose Columns\" in Meds & Orders section of the refill encounter.              Passed - Medication is active on med list        Passed - Patient is age 18 or older        Passed - No active pregnancy on record        Passed - No positive pregnancy test in last 12 months           Last Written Prescription Date:  7/11/2019  Last Fill Quantity: 90,  # refills: 2   Last office visit: 12/13/2019 with prescribing provider:  Harley   Future Office Visit:      "

## 2020-03-18 RX ORDER — PAROXETINE 20 MG/1
TABLET, FILM COATED ORAL
Qty: 90 TABLET | Refills: 1 | Status: SHIPPED | OUTPATIENT
Start: 2020-03-18 | End: 2020-09-08

## 2020-06-11 ENCOUNTER — HOSPITAL ENCOUNTER (OUTPATIENT)
Dept: MAMMOGRAPHY | Facility: CLINIC | Age: 45
Discharge: HOME | End: 2020-06-11
Attending: NURSE PRACTITIONER | Admitting: NURSE PRACTITIONER
Payer: COMMERCIAL

## 2020-06-11 DIAGNOSIS — Z12.31 VISIT FOR SCREENING MAMMOGRAM: ICD-10-CM

## 2020-06-11 PROCEDURE — 77063 BREAST TOMOSYNTHESIS BI: CPT

## 2020-09-06 DIAGNOSIS — F41.1 GAD (GENERALIZED ANXIETY DISORDER): ICD-10-CM

## 2020-09-06 NOTE — LETTER
[facility]  [address]  [facility] [address]  Phone: [phone]        September 8, 2020      Sienna Salinas                                                                                                                                [address] [facility] [address]            Dear Ms. Salinas,    We recently provided you with a 90-day medication refill of paroxetine.  This medication requires routine follow-up with your care provider.    Please schedule an appointment for follow up before this refill supply is out.    Thank you,        Sienna Grider, EDWINA/ lar        lar

## 2020-09-08 RX ORDER — PAROXETINE 20 MG/1
TABLET, FILM COATED ORAL
Qty: 90 TABLET | Refills: 0 | Status: SHIPPED | OUTPATIENT
Start: 2020-09-08 | End: 2020-09-23

## 2020-09-08 NOTE — TELEPHONE ENCOUNTER
Prescription approved per [facility] Refill Protocol.    And, pt is due for provider visit.    Reminder letter sent to pt.    Ailyn Melendrez RN

## 2020-09-23 ENCOUNTER — OFFICE VISIT (OUTPATIENT)
Dept: FAMILY MEDICINE | Facility: CLINIC | Age: 45
End: 2020-09-23
Payer: COMMERCIAL

## 2020-09-23 VITALS
RESPIRATION RATE: 16 BRPM | DIASTOLIC BLOOD PRESSURE: 70 MMHG | WEIGHT: 293 LBS | HEART RATE: 72 BPM | OXYGEN SATURATION: 98 % | HEIGHT: 65 IN | BODY MASS INDEX: 48.82 KG/M2 | SYSTOLIC BLOOD PRESSURE: 120 MMHG | TEMPERATURE: 96.9 F

## 2020-09-23 DIAGNOSIS — F41.0 PANIC ATTACK: ICD-10-CM

## 2020-09-23 DIAGNOSIS — Z23 NEED FOR PROPHYLACTIC VACCINATION AND INOCULATION AGAINST INFLUENZA: ICD-10-CM

## 2020-09-23 DIAGNOSIS — Z13.1 SCREENING FOR DIABETES MELLITUS: ICD-10-CM

## 2020-09-23 DIAGNOSIS — F41.1 GAD (GENERALIZED ANXIETY DISORDER): Primary | ICD-10-CM

## 2020-09-23 DIAGNOSIS — Z13.220 SCREENING FOR LIPOID DISORDERS: ICD-10-CM

## 2020-09-23 LAB
ANION GAP SERPL CALCULATED.3IONS-SCNC: 6 MMOL/L (ref 3–14)
BUN SERPL-MCNC: 13 MG/DL (ref 7–30)
CALCIUM SERPL-MCNC: 9 MG/DL (ref 8.5–10.1)
CHLORIDE SERPL-SCNC: 107 MMOL/L (ref 94–109)
CHOLEST SERPL-MCNC: 225 MG/DL
CO2 SERPL-SCNC: 24 MMOL/L (ref 20–32)
CREAT SERPL-MCNC: 0.72 MG/DL (ref 0.52–1.04)
GFR SERPL CREATININE-BSD FRML MDRD: >90 ML/MIN/{1.73_M2}
GLUCOSE SERPL-MCNC: 108 MG/DL (ref 70–99)
HDLC SERPL-MCNC: 73 MG/DL
LDLC SERPL CALC-MCNC: 130 MG/DL
NONHDLC SERPL-MCNC: 152 MG/DL
POTASSIUM SERPL-SCNC: 4.3 MMOL/L (ref 3.4–5.3)
SODIUM SERPL-SCNC: 137 MMOL/L (ref 133–144)
TRIGL SERPL-MCNC: 112 MG/DL
TSH SERPL DL<=0.005 MIU/L-ACNC: 2.12 MU/L (ref 0.4–4)

## 2020-09-23 PROCEDURE — 36415 COLL VENOUS BLD VENIPUNCTURE: CPT | Performed by: NURSE PRACTITIONER

## 2020-09-23 PROCEDURE — 80061 LIPID PANEL: CPT | Performed by: NURSE PRACTITIONER

## 2020-09-23 PROCEDURE — 96127 BRIEF EMOTIONAL/BEHAV ASSMT: CPT | Performed by: NURSE PRACTITIONER

## 2020-09-23 PROCEDURE — 90686 IIV4 VACC NO PRSV 0.5 ML IM: CPT | Performed by: NURSE PRACTITIONER

## 2020-09-23 PROCEDURE — 80048 BASIC METABOLIC PNL TOTAL CA: CPT | Performed by: NURSE PRACTITIONER

## 2020-09-23 PROCEDURE — 90471 IMMUNIZATION ADMIN: CPT | Performed by: NURSE PRACTITIONER

## 2020-09-23 PROCEDURE — 99213 OFFICE O/P EST LOW 20 MIN: CPT | Mod: 25 | Performed by: NURSE PRACTITIONER

## 2020-09-23 PROCEDURE — 84443 ASSAY THYROID STIM HORMONE: CPT | Performed by: NURSE PRACTITIONER

## 2020-09-23 RX ORDER — PAROXETINE 20 MG/1
20 TABLET, FILM COATED ORAL DAILY
Qty: 90 TABLET | Refills: 1 | Status: SHIPPED | OUTPATIENT
Start: 2020-09-23 | End: 2021-04-06

## 2020-09-23 RX ORDER — HYDROXYZINE HYDROCHLORIDE 25 MG/1
25 TABLET, FILM COATED ORAL 3 TIMES DAILY PRN
Qty: 30 TABLET | Refills: 3 | Status: SHIPPED | OUTPATIENT
Start: 2020-09-23 | End: 2021-04-06

## 2020-09-23 RX ORDER — CETIRIZINE HYDROCHLORIDE 10 MG/1
10 TABLET ORAL DAILY
COMMUNITY

## 2020-09-23 ASSESSMENT — ANXIETY QUESTIONNAIRES
GAD7 TOTAL SCORE: 6
7. FEELING AFRAID AS IF SOMETHING AWFUL MIGHT HAPPEN: SEVERAL DAYS
2. NOT BEING ABLE TO STOP OR CONTROL WORRYING: NOT AT ALL
5. BEING SO RESTLESS THAT IT IS HARD TO SIT STILL: NOT AT ALL
6. BECOMING EASILY ANNOYED OR IRRITABLE: SEVERAL DAYS
IF YOU CHECKED OFF ANY PROBLEMS ON THIS QUESTIONNAIRE, HOW DIFFICULT HAVE THESE PROBLEMS MADE IT FOR YOU TO DO YOUR WORK, TAKE CARE OF THINGS AT HOME, OR GET ALONG WITH OTHER PEOPLE: NOT DIFFICULT AT ALL
1. FEELING NERVOUS, ANXIOUS, OR ON EDGE: SEVERAL DAYS
3. WORRYING TOO MUCH ABOUT DIFFERENT THINGS: MORE THAN HALF THE DAYS

## 2020-09-23 ASSESSMENT — PATIENT HEALTH QUESTIONNAIRE - PHQ9
SUM OF ALL RESPONSES TO PHQ QUESTIONS 1-9: 5
5. POOR APPETITE OR OVEREATING: SEVERAL DAYS

## 2020-09-23 ASSESSMENT — MIFFLIN-ST. JEOR: SCORE: 2007.6

## 2020-09-23 NOTE — PATIENT INSTRUCTIONS
Continue present medications.  Will be notified of pending labs.  Flu shot given today.  Follow up if symptoms worsen or any new concerns.      Our Clinic hours are:  Mondays    7:20 am - 7 pm  Tues -  Fri  7:20 am - 5 pm    Clinic Phone: [phone]    The clinic lab opens at 7:30 am Mon - Fri and appointments are required.    [facility]. [phone]  Monday  8 am - 7pm  Tues - Fri 8 am - 5:30 pm

## 2020-09-23 NOTE — PROGRESS NOTES
"Subjective     Sienna Salinas is a 44 year old female who presents to clinic today for the following health issues:    HPI       Anxiety Follow-Up    How are you doing with your anxiety since your last visit? Worsened -  lost his job and she is only working part time.    Are you having other symptoms that might be associated with anxiety? Yes:  short tempered    Have you had a significant life event? Job Concerns and Financial Concerns     Are you feeling depressed? Yes:  .    Do you have any concerns with your use of alcohol or other drugs? No    Social History     Tobacco Use     Smoking status: Former Smoker     Smokeless tobacco: Former User     Tobacco comment: quit as a teen   Substance Use Topics     Alcohol use: Yes     Comment: social     Drug use: No     SANJEEV-7 SCORE 5/9/2019 5/17/2019 9/23/2020   Total Score 3 0 6     PHQ 5/9/2019 5/17/2019 9/23/2020   PHQ-9 Total Score 1 1 5   Q9: Thoughts of better off dead/self-harm past 2 weeks Not at all Not at all Not at all           How many servings of fruits and vegetables do you eat daily?  0-1    On average, how many sweetened beverages do you drink each day (Examples: soda, juice, sweet tea, etc.  Do NOT count diet or artificially sweetened beverages)?   0    How many days per week do you exercise enough to make your heart beat faster? 3 or less    How many minutes a day do you exercise enough to make your heart beat faster? 9 or less    How many days per week do you miss taking your medication? 0    She reports doing \"ok\" has had less panic attacks or issues with being unable to get out of her room. She does struggle with over thinking, worrying. Doesn't think counseling would help. She reports help from medications and will continue with those for now.      Review of Systems   Constitutional, HEENT, cardiovascular, pulmonary, gi and gu systems are negative, except as otherwise noted.      Objective    /70 (BP Location: Right arm, Patient " "Position: Chair, Cuff Size: Adult Large)   Pulse 72   Temp 96.9  F (36.1  C) (Tympanic)   Resp 16   Ht 1.651 m (5' 5\")   Wt 135.2 kg (298 lb)   LMP  (LMP Unknown)   SpO2 98%   BMI 49.59 kg/m    Body mass index is 49.59 kg/m .  Physical Exam   GENERAL: healthy, alert and no distress  NECK: no adenopathy, no asymmetry  RESP: lungs clear to auscultation - no rales, rhonchi or wheezes  CV: regular rate and rhythm, normal S1 S2, no S3 or S4, no murmur  ABDOMEN: soft, obese  MS: no gross musculoskeletal defects noted  PSYCH: pleasant, open, talkative              Assessment & Plan     SANJEEV (generalized anxiety disorder)    - PARoxetine (PAXIL) 20 MG tablet; Take 1 tablet (20 mg) by mouth daily  - hydrOXYzine (ATARAX) 25 MG tablet; Take 1 tablet (25 mg) by mouth 3 times daily as needed for anxiety  - TSH with free T4 reflex  Doing ok, wishes to continue same medications for now. Reviewed doing more self love, meditation, saying \"no\", sleep hygiene and healthy diet/exercise.  Panic attack    - hydrOXYzine (ATARAX) 25 MG tablet; Take 1 tablet (25 mg) by mouth 3 times daily as needed for anxiety  - TSH with free T4 reflex    Need for prophylactic vaccination and inoculation against influenza    - INFLUENZA VACCINE IM > 6 MONTHS VALENT IIV4 [13266]  - Vaccine Administration, Initial [92630]    Screening for lipoid disorders    - Lipid panel reflex to direct LDL Non-fasting    Screening for diabetes mellitus    - Basic metabolic panel  (Ca, Cl, CO2, Creat, Gluc, K, Na, BUN)     BMI:   Estimated body mass index is 49.59 kg/m  as calculated from the following:    Height as of this encounter: 1.651 m (5' 5\").    Weight as of this encounter: 135.2 kg (298 lb).   Weight management plan: Discussed healthy diet and exercise guidelines        See Patient Instructions  Patient Instructions   Continue present medications.  Will be notified of pending labs.  Flu shot given today.  Follow up if symptoms worsen or any new " concerns.      Our Clinic hours are:  Mondays    7:20 am - 7 pm  Tues -  Fri  7:20 am - 5 pm    Clinic Phone: [phone]    The clinic lab opens at 7:30 am Mon - Fri and appointments are required.    [facility]  Ph. [phone]  Monday  8 am - 7pm  Tues - Fri 8 am - 5:30 pm           Return in about 6 months (around 3/23/2021) for or sooner if symptoms persist or worsen, Med Check.    ZULAY Verma CNP  [facility]

## 2020-09-24 ASSESSMENT — ANXIETY QUESTIONNAIRES: GAD7 TOTAL SCORE: 6

## 2021-01-10 ENCOUNTER — HEALTH MAINTENANCE LETTER (OUTPATIENT)
Age: 46
End: 2021-01-10

## 2021-03-01 DIAGNOSIS — T78.02XA: ICD-10-CM

## 2021-03-01 DIAGNOSIS — J34.3 HYPERTROPHY OF NASAL TURBINATES: Primary | ICD-10-CM

## 2021-03-01 PROCEDURE — 36415 COLL VENOUS BLD VENIPUNCTURE: CPT

## 2021-03-01 PROCEDURE — 82785 ASSAY OF IGE: CPT

## 2021-03-01 PROCEDURE — 99000 SPECIMEN HANDLING OFFICE-LAB: CPT

## 2021-03-01 PROCEDURE — 86003 ALLG SPEC IGE CRUDE XTRC EA: CPT

## 2021-03-02 LAB
CLAM IGE QN: <0.1 KU(A)/L
CRAB IGE QN: <0.1 KU(A)/L
IGE SERPL-ACNC: 3 KIU/L (ref 0–114)
LOBSTER IGE QN: <0.1 KU(A)/L
MISCELLANEOUS TEST: NORMAL
OYSTER IGE QN: <0.1 KU(A)/L
SCALLOP IGE QN: <0.1 KU(A)/L
SHRIMP IGE QN: <0.1 KU(A)/L

## 2021-03-04 LAB — LAB SCANNED RESULT: NORMAL

## 2021-04-06 ENCOUNTER — VIRTUAL VISIT (OUTPATIENT)
Dept: FAMILY MEDICINE | Facility: CLINIC | Age: 46
End: 2021-04-06
Payer: COMMERCIAL

## 2021-04-06 DIAGNOSIS — F41.1 GAD (GENERALIZED ANXIETY DISORDER): ICD-10-CM

## 2021-04-06 DIAGNOSIS — F41.0 PANIC ATTACK: ICD-10-CM

## 2021-04-06 PROCEDURE — 96127 BRIEF EMOTIONAL/BEHAV ASSMT: CPT | Mod: 95 | Performed by: NURSE PRACTITIONER

## 2021-04-06 PROCEDURE — 99214 OFFICE O/P EST MOD 30 MIN: CPT | Mod: TEL | Performed by: NURSE PRACTITIONER

## 2021-04-06 RX ORDER — PAROXETINE 20 MG/1
20 TABLET, FILM COATED ORAL DAILY
Qty: 90 TABLET | Refills: 1 | Status: SHIPPED | OUTPATIENT
Start: 2021-04-06 | End: 2021-10-19

## 2021-04-06 RX ORDER — HYDROXYZINE HYDROCHLORIDE 25 MG/1
25 TABLET, FILM COATED ORAL 3 TIMES DAILY PRN
Qty: 30 TABLET | Refills: 3 | Status: SHIPPED | OUTPATIENT
Start: 2021-04-06 | End: 2021-07-28

## 2021-04-06 ASSESSMENT — ANXIETY QUESTIONNAIRES
2. NOT BEING ABLE TO STOP OR CONTROL WORRYING: SEVERAL DAYS
GAD7 TOTAL SCORE: 14
5. BEING SO RESTLESS THAT IT IS HARD TO SIT STILL: MORE THAN HALF THE DAYS
3. WORRYING TOO MUCH ABOUT DIFFERENT THINGS: NEARLY EVERY DAY
1. FEELING NERVOUS, ANXIOUS, OR ON EDGE: NEARLY EVERY DAY
7. FEELING AFRAID AS IF SOMETHING AWFUL MIGHT HAPPEN: NOT AT ALL
6. BECOMING EASILY ANNOYED OR IRRITABLE: MORE THAN HALF THE DAYS
IF YOU CHECKED OFF ANY PROBLEMS ON THIS QUESTIONNAIRE, HOW DIFFICULT HAVE THESE PROBLEMS MADE IT FOR YOU TO DO YOUR WORK, TAKE CARE OF THINGS AT HOME, OR GET ALONG WITH OTHER PEOPLE: SOMEWHAT DIFFICULT

## 2021-04-06 ASSESSMENT — PATIENT HEALTH QUESTIONNAIRE - PHQ9
SUM OF ALL RESPONSES TO PHQ QUESTIONS 1-9: 8
5. POOR APPETITE OR OVEREATING: NEARLY EVERY DAY

## 2021-04-06 NOTE — PROGRESS NOTES
"Sienna is a 45 year old who is being evaluated via a billable telephone visit.      What phone number would you like to be contacted at? [phone]  How would you like to obtain your AVS? MyChart    Assessment & Plan     SANJEEV (generalized anxiety disorder)    - hydrOXYzine (ATARAX) 25 MG tablet; Take 1 tablet (25 mg) by mouth 3 times daily as needed for anxiety  - PARoxetine (PAXIL) 20 MG tablet; Take 1 tablet (20 mg) by mouth daily  Offered her an increase in Paxil, declined at this time. Also declined counseling.    Panic attack    - hydrOXYzine (ATARAX) 25 MG tablet; Take 1 tablet (25 mg) by mouth 3 times daily as needed for anxiety             BMI:   Estimated body mass index is 49.59 kg/m  as calculated from the following:    Height as of 9/23/20: 1.651 m (5' 5\").    Weight as of 9/23/20: 135.2 kg (298 lb).       See Patient Instructions  Patient Instructions   Continue with current medications.  Follow up for physical, labs and med check.      Our Clinic hours are:  Mondays    7:20 am - 7 pm  Tues -  Fri  7:20 am - 5 pm    Clinic Phone: [phone]    The clinic lab opens at 7:30 am Mon - Fri and appointments are required.    [facility]  Ph. [phone]  Monday  8 am - 7pm  Tues - Fri 8 am - 5:30 pm             Return in about 6 months (around 10/6/2021) for or sooner if symptoms persist or worsen, Physical Exam, Lab Work, Med Check.    ZULAY Verma CNP  [facility]    Subjective   Sienna is a 45 year old who presents for the following health issues  accompanied by her self :    HPI     Anxiety Follow-Up    How are you doing with your anxiety since your last visit? Worsened     Are you having other symptoms that might be associated with anxiety? No    Have you had a significant life event? Housing Concerns     Are you feeling depressed? Yes:  at times    Do you have any concerns with your use of alcohol or other drugs? No    Social History     Tobacco " Use     Smoking status: Former Smoker     Smokeless tobacco: Former User     Tobacco comment: quit as a teen   Substance Use Topics     Alcohol use: Yes     Comment: social     Drug use: No     SANJEEV-7 SCORE 5/17/2019 9/23/2020 4/6/2021   Total Score 0 6 14     PHQ 5/17/2019 9/23/2020 4/6/2021   PHQ-9 Total Score 1 5 8   Q9: Thoughts of better off dead/self-harm past 2 weeks Not at all Not at all Not at all     Last PHQ-9 4/6/2021   1.  Little interest or pleasure in doing things 1   2.  Feeling down, depressed, or hopeless 1   3.  Trouble falling or staying asleep, or sleeping too much 3   4.  Feeling tired or having little energy 1   5.  Poor appetite or overeating 0   6.  Feeling bad about yourself 0   7.  Trouble concentrating 2   8.  Moving slowly or restless 0   Q9: Thoughts of better off dead/self-harm past 2 weeks 0   PHQ-9 Total Score 8   Difficulty at work, home, or with people Somewhat difficult     SANJEEV-7  4/6/2021   1. Feeling nervous, anxious, or on edge 3   2. Not being able to stop or control worrying 1   3. Worrying too much about different things 3   4. Trouble relaxing 3   5. Being so restless that it is hard to sit still 2   6. Becoming easily annoyed or irritable 2   7. Feeling afraid, as if something awful might happen 0   SANJEEV-7 Total Score 14   If you checked any problems, how difficult have they made it for you to do your work, take care of things at home, or get along with other people? Somewhat difficult         How many servings of fruits and vegetables do you eat daily?  0-1    On average, how many sweetened beverages do you drink each day (Examples: soda, juice, sweet tea, etc.  Do NOT count diet or artificially sweetened beverages)?   0    How many days per week do you exercise enough to make your heart beat faster? 3 or less    How many minutes a day do you exercise enough to make your heart beat faster? 9 or less    How many days per week do you miss taking your medication? 0    States  "she remains anxious and sometimes \"down\" her family are forced to sell their home as her  cannot find work.  She knows these feelings will pass when things improve.      Review of Systems   See above      Objective           Vitals:  No vitals were obtained today due to virtual visit.    Physical Exam   healthy, alert and no distress  PSYCH: Alert and oriented times 3; coherent speech, normal   rate and volume, able to articulate logical thoughts, able   to abstract reason, no tangential thoughts, no hallucinations   or delusions  Her affect is normal  RESP: No cough, no audible wheezing, able to talk in full sentences  Remainder of exam unable to be completed due to telephone visits                Phone call duration: 13 minutes  "

## 2021-04-06 NOTE — PATIENT INSTRUCTIONS
Continue with current medications.  Follow up for physical, labs and med check.      Our Clinic hours are:  Mondays    7:20 am - 7 pm  Tues -  Fri  7:20 am - 5 pm    Clinic Phone: [phone]    The clinic lab opens at 7:30 am Mon - Fri and appointments are required.    [facility]  Ph. [phone]  Monday  8 am - 7pm  Tues - Fri 8 am - 5:30 pm

## 2021-04-07 ASSESSMENT — ANXIETY QUESTIONNAIRES: GAD7 TOTAL SCORE: 14

## 2021-04-17 ENCOUNTER — HOSPITAL ENCOUNTER (EMERGENCY)
Facility: CLINIC | Age: 46
Discharge: HOME | End: 2021-04-17
Attending: EMERGENCY MEDICINE | Admitting: EMERGENCY MEDICINE
Payer: COMMERCIAL

## 2021-04-17 VITALS
TEMPERATURE: 98 F | RESPIRATION RATE: 16 BRPM | HEIGHT: 64 IN | DIASTOLIC BLOOD PRESSURE: 93 MMHG | HEART RATE: 77 BPM | OXYGEN SATURATION: 99 % | BODY MASS INDEX: 49.85 KG/M2 | SYSTOLIC BLOOD PRESSURE: 162 MMHG | WEIGHT: 292 LBS

## 2021-04-17 DIAGNOSIS — S61.213A LACERATION OF LEFT MIDDLE FINGER WITHOUT FOREIGN BODY WITHOUT DAMAGE TO NAIL, INITIAL ENCOUNTER: ICD-10-CM

## 2021-04-17 PROCEDURE — 99213 OFFICE O/P EST LOW 20 MIN: CPT | Mod: 25 | Performed by: EMERGENCY MEDICINE

## 2021-04-17 PROCEDURE — 12001 RPR S/N/AX/GEN/TRNK 2.5CM/<: CPT | Performed by: EMERGENCY MEDICINE

## 2021-04-17 PROCEDURE — G0463 HOSPITAL OUTPT CLINIC VISIT: HCPCS | Performed by: EMERGENCY MEDICINE

## 2021-04-17 ASSESSMENT — ENCOUNTER SYMPTOMS
WOUND: 1
DIAPHORESIS: 0

## 2021-04-17 ASSESSMENT — MIFFLIN-ST. JEOR: SCORE: 1959.5

## 2021-04-17 NOTE — ED TRIAGE NOTES
Lac to left third finger.    pt presents to ED s/p fall hit head on coumadin - LOC neuro alert called by MD Aviles

## 2021-04-17 NOTE — ED PROVIDER NOTES
History     Chief Complaint   Patient presents with     Laceration     tdap 2015     HPI  Sienna Salinas is a 45 year old right hand dominant female with laceration for distal tip of long finger of left hand.  Patient was doing some sewing and cut her finger accidentally with a sewing mayra.  Was bleeding briskly prior to arrival.  No known bleeding diathesis or anticoagulation use.  She works in retail.  Was in her usual state of health prior to the injury.    The patient's PMHx, Surgical Hx, Allergies, and Medications were all reviewed with the patient.    Allergies:  Allergies   Allergen Reactions     Shellfish Allergy Anaphylaxis     Iodine Hives     Sulfa Drugs Hives     And vomiting       Problem List:    Patient Active Problem List    Diagnosis Date Noted     Pituitary adenoma (H)      Priority: Medium     normal prolactin 5.8 in 2013       Generalized anxiety disorder      Priority: Medium     SANJEEV (generalized anxiety disorder) 05/21/2019     Priority: Medium     Positive JONATHAN (antinuclear antibody) 04/02/2015     Priority: Medium     1:80 titre, did not fit criteria for lupus       Other atopic dermatitis and related conditions 04/02/2015     Priority: Medium     History of vitamin D deficiency 04/02/2015     Priority: Medium     Insomnia 04/02/2015     Priority: Medium     Fibromyalgia 03/18/2015     Priority: Medium     referrred to [facility] for myofascial pain relief       Morbid obesity (H) 03/18/2015     Priority: Medium     IUD (intrauterine device) in place 03/17/2015     Priority: Medium     Mirena Feb 2015       Cervical high risk HPV (human papillomavirus) test positive 03/11/2015     Priority: Medium     2/26/2015: NIL Pap, + HR HPV (Neg 16/18)  Needs repeat pap/HPV 2/2016. Pt aware.  6/14/16: ASCUS, Neg HPV. Plan colp  9/21/16: No colp done per GYN provider. Plan cotest in 1 year (6/14/17).   9/19/17 Pap: NIL/neg HPV. Plan to repeat Pap+HPV cotesting in 3 yrs (2020).          Past  "Medical History:    Past Medical History:   Diagnosis Date     ASCUS of cervix with negative high risk HPV 6/14/16     Chronic fatigue syndrome 2013     Dysmenorrhea      Encounter for insertion of mirena IUD 10/04/2010, Feb 2015     Fibromyalgia 2013     Generalized anxiety disorder      Pituitary adenoma (H)        Past Surgical History:    Past Surgical History:   Procedure Laterality Date     M-PACT BSP BONE SPUR L      left toe     right knee arthroscopy      torn meniscus       Family History:    Family History   Problem Relation Age of Onset     Diabetes Father      Kidney Disease Father      Alcohol/Drug Mother      Other - See Comments Mother         embolism lung     Blood Disease Mother         AT3 blood clotting disorder     Diabetes Paternal Grandmother      Allergies Brother         Seasonal     Allergies Sister         Seasonal       Social History:  Marital Status:   [2]  Social History     Tobacco Use     Smoking status: Former Smoker     Smokeless tobacco: Former User     Tobacco comment: quit as a teen   Substance Use Topics     Alcohol use: Yes     Comment: social     Drug use: No        Medications:    calcium-vitamin D 500-125 MG-UNIT TABS  cetirizine (ZYRTEC) 10 MG tablet  hydrOXYzine (ATARAX) 25 MG tablet  levonorgestrel (MIRENA) 20 MCG/24HR IUD  Magnesium 400 MG TABS  multivitamin, therapeutic with minerals (MULTI-VITAMIN) TABS  PARoxetine (PAXIL) 20 MG tablet          Review of Systems   Constitutional: Negative for diaphoresis.   Skin: Positive for wound.   Neurological: Negative for syncope.       Physical Exam   BP: (!) 162/93  Pulse: 77  Temp: 98  F (36.7  C)  Resp: 16  Height: 162.6 cm (5' 4\")  Weight: 132.5 kg (292 lb)  SpO2: 99 %    Physical Exam  GEN: Awake, alert, and cooperative. No acute distress.  HENT: MMM. External ears and nose normal bilaterally. Atraumatic.   EYES: EOM intact. Conjunctiva clear.   NECK: Symmetric, freely mobile.   CV : extremities warm and well " perfused.   PULM: Normal effort. Speaking in full sentences.   NEURO: Normal speech. Following commands. Answering questions and interacting appropriately.   EXT: 1.5 cm linear laceration to distal tip of long finger on left hand. Does not involve nail bed. Sensation intact to light touch. Able to flex and extend PIP and DIP.   INT: warm and dry.        ED [facility]    -Laceration Repair    Date/Time: 4/17/2021 6:07 PM  Performed by: John Serrano MD  Authorized by: John Serrano MD       ANESTHESIA (see MAR for exact dosages):     Anesthesia method:  Nerve block    Block anesthetic:  Bupivacaine 0.25% w/o epi    Block technique:  Digital block    Block injection procedure:  Anatomic landmarks identified, introduced needle, incremental injection, anatomic landmarks palpated and negative aspiration for blood    Block outcome:  Anesthesia achieved      LACERATION DETAILS     Location:  Finger    Finger location:  L long finger    Length (cm):  1.5    Depth (mm):  2    REPAIR TYPE:     Repair type:  Simple      EXPLORATION:     Wound exploration: wound explored through full range of motion and entire depth of wound probed and visualized      Wound extent: no foreign body      Contaminated: no      TREATMENT:     Irrigation solution:  Tap water    Visualized foreign bodies/material removed: no      SKIN REPAIR     Repair method:  Sutures    Suture size:  4-0    Wound skin closure material used: Vicryl Rapide.    Suture technique:  Simple interrupted    Number of sutures:  2    APPROXIMATION     Approximation:  Close    POST-PROCEDURE DETAILS     Dressing:  Antibiotic ointment and bulky dressing      PROCEDURE   Patient Tolerance:  Patient tolerated the procedure well with no immediate complications                 Critical Care time:  none               No results found for this or any previous visit (from the past 24 hour(s)).    Medications - No data to  display    Assessments & Plan (with Medical Decision Making)   45 year right-hand-dominant female with accidental laceration to distal tip of left long finger.  Laceration 1.5 cm repaired primarily.  Tetanus up-to-date.  CMS intact.  Vicryl Rapide sutures used and should come out on their own.  Patient was instructed to see her primary provider for removal if they are still intact after 10 days.  To return to emergency department sooner if any signs of infection.  OTC pain control.  Follow-up, wound care, and ED return precautions discussed with patient.  She expressed agreement understanding of plan and was discharged in improved condition.    I have reviewed the nursing notes.         New Prescriptions    No medications on file       Final diagnoses:   Laceration of left middle finger without foreign body without damage to nail, initial encounter     John Serrano MD    4/17/2021   [facility] EMERGENCY DEPT    Disclaimer: This note consists of words and symbols derived from keyboarding and dictation using voice recognition software.  As a result, there may be errors that have gone undetected.  Please consider this when interpreting information found in this note.             John Serrano MD  04/17/21 [id number]

## 2021-04-17 NOTE — DISCHARGE INSTRUCTIONS
Please keep the wound clean and dry for 24-48 hours. The affected area should be kept elevated as much as possible.  After 24 hours, the dressing may be removed and the wound may be gently cleaned with warm water and soap.  You may apply antibiotic ointment as desired.  The sutures that were used are absorbable and should fall out on their own.  If they are still intact in 10 days you should see your primary physician to have them removed.      Any time the skin is damaged, scar formation is unavoidable. You can minimize the scar by following the above instructions and preventing infection. The scar will continue to evolve for at least 1 year. Final appearance of the scar will not be known until at least that time. Please apply sun screen to the scar before any sun exposure for the first year as sunburn or even tanning may cause the scar to become discolored and lead to poor cosmetic outcomes. If after 1 year, you are unhappy with the appearance of the scar you should seek follow-up with the appropriate health care professional to discuss further options.    You should return to the emergency department or your primary care physician immediately if you develop warmth, redness, swelling, drainage from the wound, or have fevers.

## 2021-05-04 ENCOUNTER — OFFICE VISIT (OUTPATIENT)
Dept: OBGYN | Facility: CLINIC | Age: 46
End: 2021-05-04
Payer: COMMERCIAL

## 2021-05-04 VITALS
HEART RATE: 82 BPM | WEIGHT: 293 LBS | RESPIRATION RATE: 16 BRPM | DIASTOLIC BLOOD PRESSURE: 74 MMHG | SYSTOLIC BLOOD PRESSURE: 144 MMHG | HEIGHT: 65 IN | BODY MASS INDEX: 48.82 KG/M2 | TEMPERATURE: 97.9 F

## 2021-05-04 DIAGNOSIS — Z30.433 ENCOUNTER FOR REMOVAL AND REINSERTION OF INTRAUTERINE CONTRACEPTIVE DEVICE: ICD-10-CM

## 2021-05-04 DIAGNOSIS — Z12.4 CERVICAL CANCER SCREENING: Primary | ICD-10-CM

## 2021-05-04 PROBLEM — Z30.430 ENCOUNTER FOR INSERTION OF MIRENA IUD: Status: ACTIVE | Noted: 2021-05-04

## 2021-05-04 LAB — HCG UR QL: NEGATIVE

## 2021-05-04 PROCEDURE — 58300 INSERT INTRAUTERINE DEVICE: CPT | Performed by: OBSTETRICS & GYNECOLOGY

## 2021-05-04 PROCEDURE — 81025 URINE PREGNANCY TEST: CPT | Performed by: OBSTETRICS & GYNECOLOGY

## 2021-05-04 PROCEDURE — 87624 HPV HI-RISK TYP POOLED RSLT: CPT | Performed by: OBSTETRICS & GYNECOLOGY

## 2021-05-04 PROCEDURE — G0124 SCREEN C/V THIN LAYER BY MD: HCPCS | Performed by: PATHOLOGY

## 2021-05-04 PROCEDURE — 58301 REMOVE INTRAUTERINE DEVICE: CPT | Performed by: OBSTETRICS & GYNECOLOGY

## 2021-05-04 PROCEDURE — G0145 SCR C/V CYTO,THINLAYER,RESCR: HCPCS | Performed by: OBSTETRICS & GYNECOLOGY

## 2021-05-04 ASSESSMENT — MIFFLIN-ST. JEOR: SCORE: 2055.67

## 2021-05-04 NOTE — NURSING NOTE
"Initial BP (!) 144/74 (BP Location: Left arm, Patient Position: Sitting, Cuff Size: Adult Large)   Pulse 82   Temp 97.9  F (36.6  C) (Tympanic)   Resp 16   Ht 1.651 m (5' 5\")   Wt 140.5 kg (309 lb 11.2 oz)   BMI 51.54 kg/m   Estimated body mass index is 51.54 kg/m  as calculated from the following:    Height as of this encounter: 1.651 m (5' 5\").    Weight as of this encounter: 140.5 kg (309 lb 11.2 oz). .      "

## 2021-05-04 NOTE — PROGRESS NOTES
[facility]    IUD Removal and Replacement Procedure Note      Sienna Salinas  1975  [id number]    Indications:    Sienna Salinas is a 45 year old year old female, who is here today for IUD removal and replacement. She is also due for a pap smear.     The patient was counseled on the risks (including including risk of infection, uterine perforation, cramping), benefits (high efficacy, low maintenance birth control, reduced risk of uterine cancer and hyperplasia, improvement in menstrual bleeding), and alternatives of the procedure. Previous Mirena IUD was placed on 2/26/2015. Desires to continue this method.       Procedure:  Verbal and written consent were obtained. A UPT was negative prior to the procedure. The patient was placed in the dorsal lithotomy position.  A speculum was placed in the vagina and the cervix with IUD strings were visualized. Pap smear collected today. The IUD strings were grasped with a ring forcep and removed intact. The IUD was placed in a sterile cup and disposed in hazardous waste. A Single-tooth tenaculum was placed on the cervix to stabilize the cervix. The Mirena IUD was loaded, advanced to the fundus, pulled back 1cm, deployed and advanced to the fundus. Using the insertor as a sound, the uterus sounded to 8.5 cm. IUD strings were cut 3cm from the external cervical os. EBL: 0cc.           Post Procedure:    The patient tolerated the procedure well. No complications. Due to iodine allergy, no iodine was used. Patient was counseled on the risk of pelvic infection after placement and given return precautions.     Patient was given the option to return for a string check. She is comfortable to monitor at home with self-string checks.    Return if needed based on pap smear results.     Janelle Lopez DO

## 2021-05-04 NOTE — NURSING NOTE
Clinic Administered Medication Documentation      Intrauterine/Implant Insertion Documentation    Device was placed by provider (please see MAR for given by information). Please see MAR and medication order for additional information.     Type: Mirena  Remove/Replace by: Placed 05/04/2021 remove by 05/04/2026    Expiration Date:  08/2023

## 2021-05-06 LAB
COPATH REPORT: ABNORMAL
PAP: ABNORMAL

## 2021-05-07 LAB
FINAL DIAGNOSIS: NORMAL
HPV HR 12 DNA CVX QL NAA+PROBE: NEGATIVE
HPV16 DNA SPEC QL NAA+PROBE: NEGATIVE
HPV18 DNA SPEC QL NAA+PROBE: NEGATIVE
SPECIMEN DESCRIPTION: NORMAL
SPECIMEN SOURCE CVX/VAG CYTO: NORMAL

## 2021-05-10 ENCOUNTER — PATIENT OUTREACH (OUTPATIENT)
Dept: OBGYN | Facility: CLINIC | Age: 46
End: 2021-05-10

## 2021-05-17 ENCOUNTER — OFFICE VISIT (OUTPATIENT)
Dept: ORTHOPEDICS | Facility: CLINIC | Age: 46
End: 2021-05-17
Payer: COMMERCIAL

## 2021-05-17 ENCOUNTER — ANCILLARY PROCEDURE (OUTPATIENT)
Dept: GENERAL RADIOLOGY | Facility: CLINIC | Age: 46
End: 2021-05-17
Attending: FAMILY MEDICINE
Payer: COMMERCIAL

## 2021-05-17 VITALS
WEIGHT: 293 LBS | SYSTOLIC BLOOD PRESSURE: 128 MMHG | BODY MASS INDEX: 48.82 KG/M2 | HEIGHT: 65 IN | DIASTOLIC BLOOD PRESSURE: 78 MMHG

## 2021-05-17 DIAGNOSIS — M17.11 PRIMARY OSTEOARTHRITIS OF RIGHT KNEE: Primary | ICD-10-CM

## 2021-05-17 DIAGNOSIS — M25.561 ACUTE PAIN OF RIGHT KNEE: ICD-10-CM

## 2021-05-17 DIAGNOSIS — E66.01 MORBID OBESITY (H): ICD-10-CM

## 2021-05-17 DIAGNOSIS — M25.561 RIGHT KNEE PAIN: ICD-10-CM

## 2021-05-17 PROCEDURE — 73562 X-RAY EXAM OF KNEE 3: CPT | Performed by: RADIOLOGY

## 2021-05-17 PROCEDURE — 20611 DRAIN/INJ JOINT/BURSA W/US: CPT | Mod: RT | Performed by: FAMILY MEDICINE

## 2021-05-17 PROCEDURE — 99203 OFFICE O/P NEW LOW 30 MIN: CPT | Mod: 25 | Performed by: FAMILY MEDICINE

## 2021-05-17 RX ADMIN — BETAMETHASONE SODIUM PHOSPHATE AND BETAMETHASONE ACETATE 6 MG: 3; 3 INJECTION, SUSPENSION INTRA-ARTICULAR; INTRALESIONAL; INTRAMUSCULAR; SOFT TISSUE at 14:56

## 2021-05-17 RX ADMIN — ROPIVACAINE HYDROCHLORIDE 3 ML: 5 INJECTION, SOLUTION EPIDURAL; INFILTRATION; PERINEURAL at 14:56

## 2021-05-17 ASSESSMENT — MIFFLIN-ST. JEOR: SCORE: 2052.49

## 2021-05-17 NOTE — PATIENT INSTRUCTIONS
# Right Knee Arthritis Flare: Notable over the past 2 weeks after slipping in the kitchen.  Pain over the medial side of the knee with tenderness to palpation of the joint line.  X-ray showing significant arthritis in the knees worse on the right than the left as well as a right knee effusion.  Etiology of pain likely due to flare arthritis.  Given this plan to treat as below  Image Findings: Bilateral knee osteoarthritis, right knee effusion  Treatment: Home exercises, referral to weightloss center  Job: No restrictions  Medications/Injections: Limited tylenol/ibuprofen for pain for 1-2 weeks, right knee aspiration/steroid injection  Follow-up: In one month if symptoms do not improve, sooner if worsening    Please call [phone]   Ask for my team if you have any questions or concerns    It was great seeing you today!    Ronaldo Reynolds MD, CAM    [facility] Injection Discharge Instructions    Procedure: Right knee aspiration/steroid injection       You may shower, however avoid swimming, tub baths or hot tubs for 24 hours following your procedure    You may have a mild to moderate increase in pain for several days following the injection.    It may take up to 14 days for the steroid medication to start working although you may feel the effect as early as a few days after the procedure.    You may use ice packs for 10-15 minutes, 3 to 4 times a day at the injection site for comfort    You may use anti-inflammatory medications (such as Ibuprofen or Aleve or Advil) or Tylenol for pain control if necessary    If you were fasting, you may resume your normal diet and medications after the procedure    If you have diabetes, check your blood sugar more frequently than usual as your blood sugar may be higher than normal for 10-14 days following a steroid injection. Contact your doctor who manages your diabetes if your blood sugar is higher than usual      If you experience any of the following, call [facility] @ [phone]  or [phone]  -Fever over 100 degree F  -Swelling, bleeding, redness, drainage, warmth at the injection site  - New or worsening pain

## 2021-05-17 NOTE — PROGRESS NOTES
ASSESSMENT & PLAN  Sienna was seen today for pain.    Diagnoses and all orders for this visit:    Primary osteoarthritis of right knee  -     Large Joint Injection/Arthocentesis: R knee joint    Acute pain of right knee  -     XR Knee Standing AP Bilat Buckman Bilat Lat Right; Future  -     Large Joint Injection/Arthocentesis: R knee joint    Morbid obesity (H)  -     COMPREHENSIVE WEIGHT MANAGEMENT      Patient is a 45 year old female presenting for evaluation of acute on chronic right knee pain. Aggravated in setting of slipping in kitchen. Diffusely sore now. Consistent with arthritic flare as seen on XR, effusion also present.    Given this plan to treat as flare of osteoarthritis with plan as below follow-up in 1 month as needed.     Treatment: Discussed weight loss, referral placed  Physical Therapy HEP, if not improved can refer for formal PT  Injection right knee aspiration/steroid injection right knee  Medications Limited NSAIDs/Tylenol    Concerning signs/sx that would warrant urgent evaluation were discussed.  All questions were answered, patient and  understand and agree with plan.      -----  SUBJECTIVE  Sienna Salinas is a/an 45 year old female who is seen as a self referral for evaluation of right knee pain. The patient is seen with their .    Onset: 2 week(s) ago. Patient describes injury as slipped in kitchen, which reginald knee.   Location of Pain: right medial knee   Rating of Pain at worst: 8/10  Rating of Pain Currently: 2-3/10  Worsened by: going down stairs, walking, standing, bending   Better with: ice  Treatments tried: rest/activity avoidance, elevation, ice and ibuprofen  Associated symptoms: swelling  Orthopedic history: YES - chronic   Relevant surgical history: YES - knee scope 10-15 years [facility]   Social: retail   Past Medical History:   Diagnosis Date     ASCUS of cervix with negative high risk HPV 06/14/2016 5/4/21     Chronic fatigue syndrome 2013      Dysmenorrhea      Encounter for insertion of mirena IUD 10/04/2010, Feb 2015     Encounter for insertion of mirena IUD 05/04/2021    Mirena replaced 05/04/2021   LOT [id number]  EXP 08/2023      Fibromyalgia 2013     Generalized anxiety disorder      Pituitary adenoma (H)     normal prolactin 5.8 in 2013     Social History     Socioeconomic History     Marital status:      Spouse name: Selvin     Number of children: 2     Years of education: None     Highest education level: None   Occupational History     Occupation: Office     Comment: fulltime   Social Needs     Financial resource strain: None     Food insecurity     Worry: None     Inability: None     Transportation needs     Medical: None     Non-medical: None   Tobacco Use     Smoking status: Former Smoker     Smokeless tobacco: Former User     Tobacco comment: quit as a teen   Substance and Sexual Activity     Alcohol use: Yes     Comment: social     Drug use: No     Sexual activity: Yes     Partners: Male     Birth control/protection: I.U.D.     Comment: Mirena   Lifestyle     Physical activity     Days per week: None     Minutes per session: None     Stress: None   Relationships     Social connections     Talks on phone: None     Gets together: None     Attends [employer] service: None     Active member of club or organization: None     Attends meetings of clubs or organizations: None     Relationship status: None     Intimate partner violence     Fear of current or ex partner: None     Emotionally abused: None     Physically abused: None     Forced sexual activity: None   Other Topics Concern     Parent/sibling w/ CABG, MI or angioplasty before 65F 55M? Not Asked   Social History Narrative    How much exercise per week? 4x wk, walking and yoga    How much calcium per day? Not sure       How much caffeine per day? 1 cup coffee    How much vitamin D per day? Not sure    Do you/your family wear seatbelts?  Yes    Do you/your family use safety helmets? Yes     "Do you/your family use sunscreen? Yes    Do you/your family keep firearms in the home? No    Do you/your family have a smoke detector(s)? Yes    Do you feel safe in your home? Yes    Has anyone ever touched you in an unwanted manner? No     Explain     See JED Pitts 02/26/2015     No family history pertinent to the patient's problem today    Patient's past medical, surgical, social, and family histories were reviewed today and no changes are noted.    REVIEW OF SYSTEMS:  10 point ROS is negative other than symptoms noted above in HPI, Past Medical History or as stated below  Constitutional: NEGATIVE for fever, chills, change in weight  Skin: NEGATIVE for worrisome rashes, moles or lesions  GI/: NEGATIVE for bowel or bladder changes  Neuro: NEGATIVE for weakness, dizziness or paresthesias    OBJECTIVE:  /78   Ht 1.651 m (5' 5\")   Wt 140.2 kg (309 lb)   BMI 51.42 kg/m     General: healthy, alert and in no distress  HEENT: no scleral icterus or conjunctival erythema  Skin: no suspicious lesions or rash. No jaundice.  CV: no pedal edema  Resp: normal respiratory effort without conversational dyspnea   Psych: normal mood and affect  Gait: normal steady gait with appropriate coordination and balance  Neuro: Normal light sensory exam of lower extremity  MSK:  RIGHT KNEE  Inspection:    normal alignment  Palpation:    Tender about the medial joint line. Remainder of bony and ligamentous landmarks are nontender.    Small effusion is present    Patellofemoral crepitus is absent  Range of Motion:     50 extension to 1200 flexion  Strength:    Quadriceps 5/5, hamstrings 5/5, gastrocsoleus 5/5 and tibialis anterior 5/5    Extensor mechanism intact  Special Tests:    Positive: None    Negative: Patellar grind, MCL/valgus stress (0 & 30 deg), LCL/varus stress (0 & 30 deg), anterior drawer, posterior drawer, Fox's. Sore with most of these maneuvers    Independent visualization of the below image:  Knee XR: Arthritic " changes, effusion noted on imaging    Ordered, visualized and reviewed these images with the patient  Moderate to severe osteoarthritis in the medial compartments right greater than left, severe patellofemoral osteoarthritis, mild right knee effusion.  Harshad Virk MD  PGY-3, PM&R    Ronaldo Reynlods MD    Large Joint Injection/Arthocentesis: R knee joint    Date/Time: 5/17/2021 2:56 PM  Performed by: Ronaldo Reynolds MD  Authorized by: Ronaldo Reynolds MD     Indications:  Pain and osteoarthritis  Needle Size:  20 G  Guidance: ultrasound    Approach:  Superolateral  Location:  Knee      Medications:  6 mg betamethasone acet & sod phos 6 (3-3) MG/ML; 3 mL ropivacaine 5 MG/ML  Medications comment:  Actual amount of ropivacaine used 4 mL  Aspirate amount (mL):  11  Aspirate:  Serous and yellow  Outcome:  Tolerated well, no immediate complications  Procedure discussed: discussed risks, benefits, and alternatives    Consent Given by:  Patient  Timeout: timeout called immediately prior to procedure    Prep: patient was prepped and draped in usual sterile fashion     Patient reported significant improvement of pain after right knee aspiration/steroid injection.  Aftercare instructions given to patient.  Plan to follow-up as discussed above.     Ronaldo Reynolds MD [facility]

## 2021-05-17 NOTE — LETTER
5/17/2021         RE: Sienna Salinas  [address] [facility] [address]        Dear Colleague,    Thank you for referring your patient, Sienna Salinas, to the [facility] [place]. Please see a copy of my visit note below.    ASSESSMENT & PLAN  Sienna was seen today for pain.    Diagnoses and all orders for this visit:    Primary osteoarthritis of right knee  -     Large Joint Injection/Arthocentesis: R knee joint    Acute pain of right knee  -     XR Knee Standing AP Bilat [place] Bilat Lat Right; Future  -     Large Joint Injection/Arthocentesis: R knee joint    Morbid obesity (H)  -     COMPREHENSIVE WEIGHT MANAGEMENT      Patient is a 45 year old female presenting for evaluation of acute on chronic right knee pain. Aggravated in setting of slipping in kitchen. Diffusely sore now. Consistent with arthritic flare as seen on XR, effusion also present.    Given this plan to treat as flare of osteoarthritis with plan as below follow-up in 1 month as needed.     Treatment: Discussed weight loss, referral placed  Physical Therapy HEP, if not improved can refer for formal PT  Injection right knee aspiration/steroid injection right knee  Medications Limited NSAIDs/Tylenol    Concerning signs/sx that would warrant urgent evaluation were discussed.  All questions were answered, patient and  understand and agree with plan.      -----  SUBJECTIVE  Sienna Salinas is a/an 45 year old female who is seen as a self referral for evaluation of right knee pain. The patient is seen with their .    Onset: 2 week(s) ago. Patient describes injury as slipped in kitchen, which reginald knee.   Location of Pain: right medial knee   Rating of Pain at worst: 8/10  Rating of Pain Currently: 2-3/10  Worsened by: going down stairs, walking, standing, bending   Better with: ice  Treatments tried: rest/activity avoidance, elevation, ice and ibuprofen  Associated symptoms:  swelling  Orthopedic history: YES - chronic   Relevant surgical history: YES - knee scope 10-15 years [facility]   Social: retail   Past Medical History:   Diagnosis Date     ASCUS of cervix with negative high risk HPV 06/14/2016 5/4/21     Chronic fatigue syndrome 2013     Dysmenorrhea      Encounter for insertion of mirena IUD 10/04/2010, Feb 2015     Encounter for insertion of mirena IUD 05/04/2021    Mirena replaced 05/04/2021   LOT [id number]  EXP 08/2023      Fibromyalgia 2013     Generalized anxiety disorder      Pituitary adenoma (H)     normal prolactin 5.8 in 2013     Social History     Socioeconomic History     Marital status:      Spouse name: Selvin     Number of children: 2     Years of education: None     Highest education level: None   Occupational History     Occupation: Office     Comment: fulltime   Social Needs     Financial resource strain: None     Food insecurity     Worry: None     Inability: None     Transportation needs     Medical: None     Non-medical: None   Tobacco Use     Smoking status: Former Smoker     Smokeless tobacco: Former User     Tobacco comment: quit as a teen   Substance and Sexual Activity     Alcohol use: Yes     Comment: social     Drug use: No     Sexual activity: Yes     Partners: Male     Birth control/protection: I.U.D.     Comment: Mirena   Lifestyle     Physical activity     Days per week: None     Minutes per session: None     Stress: None   Relationships     Social connections     Talks on phone: None     Gets together: None     Attends [employer] service: None     Active member of club or organization: None     Attends meetings of clubs or organizations: None     Relationship status: None     Intimate partner violence     Fear of current or ex partner: None     Emotionally abused: None     Physically abused: None     Forced sexual activity: None   Other Topics Concern     Parent/sibling w/ CABG, MI or angioplasty before 65F 55M? Not Asked   Social History  "Narrative    How much exercise per week? 4x wk, walking and yoga    How much calcium per day? Not sure       How much caffeine per day? 1 cup coffee    How much vitamin D per day? Not sure    Do you/your family wear seatbelts?  Yes    Do you/your family use safety helmets? Yes    Do you/your family use sunscreen? Yes    Do you/your family keep firearms in the home? No    Do you/your family have a smoke detector(s)? Yes    Do you feel safe in your home? Yes    Has anyone ever touched you in an unwanted manner? No     Explain     See JED Pitts 02/26/2015     No family history pertinent to the patient's problem today    Patient's past medical, surgical, social, and family histories were reviewed today and no changes are noted.    REVIEW OF SYSTEMS:  10 point ROS is negative other than symptoms noted above in HPI, Past Medical History or as stated below  Constitutional: NEGATIVE for fever, chills, change in weight  Skin: NEGATIVE for worrisome rashes, moles or lesions  GI/: NEGATIVE for bowel or bladder changes  Neuro: NEGATIVE for weakness, dizziness or paresthesias    OBJECTIVE:  /78   Ht 1.651 m (5' 5\")   Wt 140.2 kg (309 lb)   BMI 51.42 kg/m     General: healthy, alert and in no distress  HEENT: no scleral icterus or conjunctival erythema  Skin: no suspicious lesions or rash. No jaundice.  CV: no pedal edema  Resp: normal respiratory effort without conversational dyspnea   Psych: normal mood and affect  Gait: normal steady gait with appropriate coordination and balance  Neuro: Normal light sensory exam of lower extremity  MSK:  RIGHT KNEE  Inspection:    normal alignment  Palpation:    Tender about the medial joint line. Remainder of bony and ligamentous landmarks are nontender.    Small effusion is present    Patellofemoral crepitus is absent  Range of Motion:     50 extension to 1200 flexion  Strength:    Quadriceps 5/5, hamstrings 5/5, gastrocsoleus 5/5 and tibialis anterior 5/5    Extensor mechanism " intact  Special Tests:    Positive: None    Negative: Patellar grind, MCL/valgus stress (0 & 30 deg), LCL/varus stress (0 & 30 deg), anterior drawer, posterior drawer, Fox's. Sore with most of these maneuvers    Independent visualization of the below image:  Knee XR: Arthritic changes, effusion noted on imaging    Ordered, visualized and reviewed these images with the patient  Moderate to severe osteoarthritis in the medial compartments right greater than left, severe patellofemoral osteoarthritis, mild right knee effusion.  Harshad Virk MD  PGY-3, PM&R    Ronaldo Reynolds MD    Large Joint Injection/Arthocentesis: R knee joint    Date/Time: 5/17/2021 2:56 PM  Performed by: Ronaldo Reynolds MD  Authorized by: Ronaldo Reynolds MD     Indications:  Pain and osteoarthritis  Needle Size:  20 G  Guidance: ultrasound    Approach:  Superolateral  Location:  Knee      Medications:  6 mg betamethasone acet & sod phos 6 (3-3) MG/ML; 3 mL ropivacaine 5 MG/ML  Medications comment:  Actual amount of ropivacaine used 4 mL  Aspirate amount (mL):  11  Aspirate:  Serous and yellow  Outcome:  Tolerated well, no immediate complications  Procedure discussed: discussed risks, benefits, and alternatives    Consent Given by:  Patient  Timeout: timeout called immediately prior to procedure    Prep: patient was prepped and draped in usual sterile fashion     Patient reported significant improvement of pain after right knee aspiration/steroid injection.  Aftercare instructions given to patient.  Plan to follow-up as discussed above.     Ronaldo Reynolds MD [facility]                Again, thank you for allowing me to participate in the care of your patient.        Sincerely,        Ronaldo Reynolds MD

## 2021-05-20 ENCOUNTER — TELEPHONE (OUTPATIENT)
Dept: FAMILY MEDICINE | Facility: CLINIC | Age: 46
End: 2021-05-20

## 2021-05-20 RX ORDER — BETAMETHASONE SODIUM PHOSPHATE AND BETAMETHASONE ACETATE 3; 3 MG/ML; MG/ML
6 INJECTION, SUSPENSION INTRA-ARTICULAR; INTRALESIONAL; INTRAMUSCULAR; SOFT TISSUE
Status: SHIPPED | OUTPATIENT
Start: 2021-05-17

## 2021-05-20 RX ORDER — ROPIVACAINE HYDROCHLORIDE 5 MG/ML
3 INJECTION, SOLUTION EPIDURAL; INFILTRATION; PERINEURAL
Status: SHIPPED | OUTPATIENT
Start: 2021-05-17

## 2021-05-20 NOTE — TELEPHONE ENCOUNTER
Prior Authorization Retail Medication Request    Medication/Dose: Prevident 5000 sensitive 1.1-5 paste  ICD code (if different than what is on RX):     Previously Tried and Failed:    Rationale:      Insurance Name:  SHANNAN WOODS  Insurance ID:  [id number]      Pharmacy Information (if different than what is on RX)  Name:   [facility]   Phone:  [phone]    Thank You,  Jennifer Cabrera T  [facility] [phone]

## 2021-05-21 NOTE — TELEPHONE ENCOUNTER
I have not ordered this medication.   If she wishes to have it, will need appointment to discuss or can contact originating prescriber.   MAEVE Kee     Left message on answering machine to return call. Direct line provided.  Billie Lyman RN

## 2021-05-21 NOTE — TELEPHONE ENCOUNTER
Sienna Grider,  Did you order Prevident 5000 sensitive 1.1-5 paste for patient.  If so, it requires PA and PA team needs an order in Epic for medication.  Teresa Jeff

## 2021-05-21 NOTE — TELEPHONE ENCOUNTER
Pt is returning call and is going to contact the dentist that ordered this medication. Pt is not sure why pharmacy sent it to PCP and not the dentist

## 2021-07-26 DIAGNOSIS — F41.0 PANIC ATTACK: ICD-10-CM

## 2021-07-26 DIAGNOSIS — F41.1 GAD (GENERALIZED ANXIETY DISORDER): ICD-10-CM

## 2021-07-28 RX ORDER — HYDROXYZINE HYDROCHLORIDE 25 MG/1
25 TABLET, FILM COATED ORAL 3 TIMES DAILY PRN
Qty: 30 TABLET | Refills: 3 | Status: SHIPPED | OUTPATIENT
Start: 2021-07-28

## 2021-10-19 ENCOUNTER — TELEPHONE (OUTPATIENT)
Dept: FAMILY MEDICINE | Facility: CLINIC | Age: 46
End: 2021-10-19

## 2021-10-19 DIAGNOSIS — F41.1 GAD (GENERALIZED ANXIETY DISORDER): ICD-10-CM

## 2021-10-19 RX ORDER — PAROXETINE 20 MG/1
20 TABLET, FILM COATED ORAL DAILY
Qty: 90 TABLET | Refills: 1 | Status: SHIPPED | OUTPATIENT
Start: 2021-10-19

## 2021-10-19 NOTE — TELEPHONE ENCOUNTER
Reason for Call:  Other prescription    Detailed comments: Pt out of medication Paxil 20 mg and is wondering if [facility] will fill medication. [place] and is [place] to do in person or virtual appointment. Please call pt back to discuss.    Phone Number Patient can be reached at: Cell number on file:    Telephone Information:   Mobile [phone]       Best Time: na    Can we leave a detailed message on this number? Not Applicable    Call taken on 10/19/2021 at 1:42 PM by Radha Parisi

## 2021-10-19 NOTE — TELEPHONE ENCOUNTER
Routing refill request to provider for review/approval because:  Paxil refill.  Last seen and written on 4/6/21 with PHQ-9 was 8.  Santino Weber

## 2021-10-19 NOTE — TELEPHONE ENCOUNTER
Sienna Grider APRN CNP  [facility] 3 minutes ago (3:12 PM)     CV    Ok to refill.   MAEVE Kee    Message text      Paxil refill sent to [facility]. [email or web address]

## 2021-10-23 ENCOUNTER — HEALTH MAINTENANCE LETTER (OUTPATIENT)
Age: 46
End: 2021-10-23

## 2021-12-18 ENCOUNTER — HEALTH MAINTENANCE LETTER (OUTPATIENT)
Age: 46
End: 2021-12-18

## 2022-02-12 ENCOUNTER — HEALTH MAINTENANCE LETTER (OUTPATIENT)
Age: 47
End: 2022-02-12

## 2022-04-13 ENCOUNTER — PATIENT OUTREACH (OUTPATIENT)
Dept: OBGYN | Facility: CLINIC | Age: 47
End: 2022-04-13
Payer: COMMERCIAL

## 2022-04-13 DIAGNOSIS — R87.610 ASCUS OF CERVIX WITH NEGATIVE HIGH RISK HPV: ICD-10-CM

## 2022-04-13 NOTE — LETTER
April 13, 2022      Sienna Salinas  [address] [facility] [address]        Dear Ms.St [place]ran,    This letter is to remind you that you are due for your follow-up Pap smear and Human Papillomavirus (HPV) test.    Please call [phone] to schedule your appointment at your earliest convenience.    If you have completed the appointment outside of the [facility] system, please have the records forwarded to our office. We will update your chart for your provider to review before your next annual wellness visit.     Thank you for choosing [facility]!      Sincerely,    Your [facility] Care Team

## 2022-06-16 NOTE — TELEPHONE ENCOUNTER
FYI to provider - Patient is lost to pap tracking follow-up. Attempts to contact pt have been made per reminder process and there has been no reply and/or no appt scheduled. Contact hx listed below.     2/26/15 NIL Pap, + HR HPV (Neg 16/18)  Needs repeat pap/HPV 2/2016. Pt aware.  6/14/16 ASCUS, Neg HPV. Plan colp  9/21/16 No colp done per GYN provider. Plan cotest in 1 year (6/14/17).   9/19/17 NIL/neg HPV. Plan to repeat Pap+HPV cotesting in 3 yrs (2020).  5/4/21 ASCUS pap, Neg HPV. Plan cotest in 1 year due bef 5/4/22.  5/10/21 Result letter sent to pt via [employer]. Pt viewed results.  4/13/22 Reminder letter  5/12/22 Reminder call -- left message  6/16/22 Lost to follow-up for pap tracking     Luzmaria Brandon RN BSN, Pap Tracking

## 2022-10-09 ENCOUNTER — HEALTH MAINTENANCE LETTER (OUTPATIENT)
Age: 47
End: 2022-10-09

## 2023-02-18 ENCOUNTER — HEALTH MAINTENANCE LETTER (OUTPATIENT)
Age: 48
End: 2023-02-18

## 2024-03-16 ENCOUNTER — HEALTH MAINTENANCE LETTER (OUTPATIENT)
Age: 49
End: 2024-03-16

## 2025-02-22 ENCOUNTER — HEALTH MAINTENANCE LETTER (OUTPATIENT)
Age: 50
End: 2025-02-22

## 2025-03-22 ENCOUNTER — HEALTH MAINTENANCE LETTER (OUTPATIENT)
Age: 50
End: 2025-03-22